# Patient Record
Sex: FEMALE | Race: OTHER | HISPANIC OR LATINO | URBAN - METROPOLITAN AREA
[De-identification: names, ages, dates, MRNs, and addresses within clinical notes are randomized per-mention and may not be internally consistent; named-entity substitution may affect disease eponyms.]

---

## 2018-04-17 ENCOUNTER — EMERGENCY (EMERGENCY)
Facility: HOSPITAL | Age: 28
LOS: 1 days | Discharge: HOME | End: 2018-04-17
Attending: EMERGENCY MEDICINE
Payer: MEDICAID

## 2018-04-17 VITALS
OXYGEN SATURATION: 98 % | HEART RATE: 85 BPM | DIASTOLIC BLOOD PRESSURE: 72 MMHG | WEIGHT: 166.01 LBS | HEIGHT: 70 IN | RESPIRATION RATE: 16 BRPM | TEMPERATURE: 98 F | SYSTOLIC BLOOD PRESSURE: 116 MMHG

## 2018-04-17 LAB — HCG UR QL: NEGATIVE — SIGNIFICANT CHANGE UP

## 2018-04-17 PROCEDURE — 81025 URINE PREGNANCY TEST: CPT

## 2018-04-17 PROCEDURE — 99285 EMERGENCY DEPT VISIT HI MDM: CPT

## 2018-04-17 PROCEDURE — 73080 X-RAY EXAM OF ELBOW: CPT | Mod: 26,LT

## 2018-04-17 PROCEDURE — 73080 X-RAY EXAM OF ELBOW: CPT

## 2018-04-17 PROCEDURE — 99283 EMERGENCY DEPT VISIT LOW MDM: CPT | Mod: 25

## 2018-04-17 RX ORDER — IBUPROFEN 200 MG
600 TABLET ORAL ONCE
Qty: 0 | Refills: 0 | Status: COMPLETED | OUTPATIENT
Start: 2018-04-17 | End: 2018-04-17

## 2018-04-17 RX ORDER — IBUPROFEN 200 MG
1 TABLET ORAL
Qty: 15 | Refills: 0
Start: 2018-04-17

## 2018-04-17 RX ADMIN — Medication 600 MILLIGRAM(S): at 10:10

## 2018-04-17 NOTE — ED PROVIDER NOTE - OBJECTIVE STATEMENT
29 y/o F pt with PMHx of Asthma and Heart Murmur and no significant PSHx presents to ED c/o L elbow pain x1 week. Pt believes L elbow pain may be due to lifting her infant from her stroller frequently. Pt additionally reports occasional numbness to the L hand and tingling to the L hand. Pt denies L shoulder pain, or any other complaints. Pt also denies recent fall, recent injury to the L elbow, or having taken any medications for pain relief. Allergies: Penicillin (hives).

## 2018-04-17 NOTE — ED ADULT NURSE NOTE - OBJECTIVE STATEMENT
pt is here for left elbow pain.  c/o pain 9/10, pt stated that "I didn't injury".  pt calm at this time, denied chest pain or sob.

## 2018-04-17 NOTE — ED PROVIDER NOTE - MEDICAL DECISION MAKING DETAILS
27 y/o F pt presents with atraumatic elbow pain, likely due to overuse. Will X-Ray L elbow, provide pain control, and will reassess. Pt is neurovascularly intact.

## 2018-07-19 ENCOUNTER — EMERGENCY (EMERGENCY)
Facility: HOSPITAL | Age: 28
LOS: 1 days | Discharge: HOME | End: 2018-07-19
Attending: EMERGENCY MEDICINE
Payer: MEDICAID

## 2018-07-19 VITALS
WEIGHT: 169.98 LBS | SYSTOLIC BLOOD PRESSURE: 133 MMHG | RESPIRATION RATE: 20 BRPM | HEIGHT: 70 IN | HEART RATE: 94 BPM | TEMPERATURE: 98 F | OXYGEN SATURATION: 100 % | DIASTOLIC BLOOD PRESSURE: 87 MMHG

## 2018-07-19 VITALS
DIASTOLIC BLOOD PRESSURE: 85 MMHG | TEMPERATURE: 99 F | SYSTOLIC BLOOD PRESSURE: 113 MMHG | HEART RATE: 71 BPM | OXYGEN SATURATION: 100 % | RESPIRATION RATE: 16 BRPM

## 2018-07-19 LAB
ALBUMIN SERPL ELPH-MCNC: 4 G/DL — SIGNIFICANT CHANGE UP (ref 3.5–5)
ALP SERPL-CCNC: 48 U/L — SIGNIFICANT CHANGE UP (ref 40–120)
ALT FLD-CCNC: 18 U/L DA — SIGNIFICANT CHANGE UP (ref 10–60)
ANION GAP SERPL CALC-SCNC: 5 MMOL/L — SIGNIFICANT CHANGE UP (ref 5–17)
APPEARANCE UR: CLEAR — SIGNIFICANT CHANGE UP
AST SERPL-CCNC: 10 U/L — SIGNIFICANT CHANGE UP (ref 10–40)
BACTERIA # UR AUTO: ABNORMAL /HPF
BILIRUB SERPL-MCNC: 0.5 MG/DL — SIGNIFICANT CHANGE UP (ref 0.2–1.2)
BILIRUB UR-MCNC: NEGATIVE — SIGNIFICANT CHANGE UP
BUN SERPL-MCNC: 13 MG/DL — SIGNIFICANT CHANGE UP (ref 7–18)
CALCIUM SERPL-MCNC: 9 MG/DL — SIGNIFICANT CHANGE UP (ref 8.4–10.5)
CHLORIDE SERPL-SCNC: 111 MMOL/L — HIGH (ref 96–108)
CO2 SERPL-SCNC: 26 MMOL/L — SIGNIFICANT CHANGE UP (ref 22–31)
COLOR SPEC: YELLOW — SIGNIFICANT CHANGE UP
CREAT SERPL-MCNC: 0.72 MG/DL — SIGNIFICANT CHANGE UP (ref 0.5–1.3)
DIFF PNL FLD: ABNORMAL
EPI CELLS # UR: SIGNIFICANT CHANGE UP /HPF
GLUCOSE SERPL-MCNC: 83 MG/DL — SIGNIFICANT CHANGE UP (ref 70–99)
GLUCOSE UR QL: NEGATIVE — SIGNIFICANT CHANGE UP
HCG SERPL-ACNC: <1 MIU/ML — SIGNIFICANT CHANGE UP
HCT VFR BLD CALC: 39.2 % — SIGNIFICANT CHANGE UP (ref 34.5–45)
HGB BLD-MCNC: 13.2 G/DL — SIGNIFICANT CHANGE UP (ref 11.5–15.5)
KETONES UR-MCNC: NEGATIVE — SIGNIFICANT CHANGE UP
LEUKOCYTE ESTERASE UR-ACNC: ABNORMAL
LIDOCAIN IGE QN: 62 U/L — LOW (ref 73–393)
MCHC RBC-ENTMCNC: 28.6 PG — SIGNIFICANT CHANGE UP (ref 27–34)
MCHC RBC-ENTMCNC: 33.6 GM/DL — SIGNIFICANT CHANGE UP (ref 32–36)
MCV RBC AUTO: 85.1 FL — SIGNIFICANT CHANGE UP (ref 80–100)
NITRITE UR-MCNC: NEGATIVE — SIGNIFICANT CHANGE UP
PH UR: 5 — SIGNIFICANT CHANGE UP (ref 5–8)
PLATELET # BLD AUTO: 339 K/UL — SIGNIFICANT CHANGE UP (ref 150–400)
POTASSIUM SERPL-MCNC: 4.3 MMOL/L — SIGNIFICANT CHANGE UP (ref 3.5–5.3)
POTASSIUM SERPL-SCNC: 4.3 MMOL/L — SIGNIFICANT CHANGE UP (ref 3.5–5.3)
PROT SERPL-MCNC: 7.7 G/DL — SIGNIFICANT CHANGE UP (ref 6–8.3)
PROT UR-MCNC: NEGATIVE — SIGNIFICANT CHANGE UP
RBC # BLD: 4.61 M/UL — SIGNIFICANT CHANGE UP (ref 3.8–5.2)
RBC # FLD: 12.3 % — SIGNIFICANT CHANGE UP (ref 10.3–14.5)
RBC CASTS # UR COMP ASSIST: SIGNIFICANT CHANGE UP /HPF (ref 0–2)
SODIUM SERPL-SCNC: 142 MMOL/L — SIGNIFICANT CHANGE UP (ref 135–145)
SP GR SPEC: 1.02 — SIGNIFICANT CHANGE UP (ref 1.01–1.02)
UROBILINOGEN FLD QL: NEGATIVE — SIGNIFICANT CHANGE UP
WBC # BLD: 9.5 K/UL — SIGNIFICANT CHANGE UP (ref 3.8–10.5)
WBC # FLD AUTO: 9.5 K/UL — SIGNIFICANT CHANGE UP (ref 3.8–10.5)
WBC UR QL: SIGNIFICANT CHANGE UP /HPF (ref 0–5)

## 2018-07-19 PROCEDURE — 80053 COMPREHEN METABOLIC PANEL: CPT

## 2018-07-19 PROCEDURE — 83690 ASSAY OF LIPASE: CPT

## 2018-07-19 PROCEDURE — 84702 CHORIONIC GONADOTROPIN TEST: CPT

## 2018-07-19 PROCEDURE — 96374 THER/PROPH/DIAG INJ IV PUSH: CPT

## 2018-07-19 PROCEDURE — 81001 URINALYSIS AUTO W/SCOPE: CPT

## 2018-07-19 PROCEDURE — 76705 ECHO EXAM OF ABDOMEN: CPT

## 2018-07-19 PROCEDURE — 85027 COMPLETE CBC AUTOMATED: CPT

## 2018-07-19 PROCEDURE — 99284 EMERGENCY DEPT VISIT MOD MDM: CPT

## 2018-07-19 PROCEDURE — 76705 ECHO EXAM OF ABDOMEN: CPT | Mod: 26

## 2018-07-19 PROCEDURE — 99284 EMERGENCY DEPT VISIT MOD MDM: CPT | Mod: 25

## 2018-07-19 RX ORDER — SODIUM CHLORIDE 9 MG/ML
10001 INJECTION INTRAMUSCULAR; INTRAVENOUS; SUBCUTANEOUS ONCE
Qty: 0 | Refills: 0 | Status: DISCONTINUED | OUTPATIENT
Start: 2018-07-19 | End: 2018-07-19

## 2018-07-19 RX ORDER — PANTOPRAZOLE SODIUM 20 MG/1
1 TABLET, DELAYED RELEASE ORAL
Qty: 30 | Refills: 0
Start: 2018-07-19 | End: 2018-08-17

## 2018-07-19 RX ORDER — FAMOTIDINE 10 MG/ML
20 INJECTION INTRAVENOUS ONCE
Qty: 0 | Refills: 0 | Status: COMPLETED | OUTPATIENT
Start: 2018-07-19 | End: 2018-07-19

## 2018-07-19 RX ORDER — SODIUM CHLORIDE 9 MG/ML
1000 INJECTION INTRAMUSCULAR; INTRAVENOUS; SUBCUTANEOUS ONCE
Qty: 0 | Refills: 0 | Status: COMPLETED | OUTPATIENT
Start: 2018-07-19 | End: 2018-07-19

## 2018-07-19 RX ADMIN — FAMOTIDINE 20 MILLIGRAM(S): 10 INJECTION INTRAVENOUS at 12:42

## 2018-07-19 RX ADMIN — SODIUM CHLORIDE 1000 MILLILITER(S): 9 INJECTION INTRAMUSCULAR; INTRAVENOUS; SUBCUTANEOUS at 12:30

## 2018-07-19 NOTE — ED PROVIDER NOTE - ABDOMINAL EXAM
soft/midline hernia above umbilicus, soft and reducible, midline surgical scar/nondistended/HERNIATION/tender...

## 2018-07-19 NOTE — ED PROVIDER NOTE - MEDICAL DECISION MAKING DETAILS
27 y/o F presents with upper abdominal pain. Will administer IV hydration and Pepcid. Will obtain ultrasound of gallbladder and check labs including CBC, CMP, lipase, urinalysis, and Beta-HCG. Will reassess.

## 2018-07-19 NOTE — ED PROVIDER NOTE - OBJECTIVE STATEMENT
29 y/o F with PMHx of asthma, heart murmur and no significant PSHx presents to the ED with complaints of upper abdominal pain x a few months. Patient states pain radiates to the back and leg. Patient states pain is worsening with associated nausea and vomiting. Patient states last bowel movement was yesterday. Patient denies hematuria, dysuria or any other complaints. LNMP: 6/2018. Allergies: Seafood (anaphylaxis), PCN (hives).

## 2018-09-01 ENCOUNTER — OUTPATIENT (OUTPATIENT)
Dept: OUTPATIENT SERVICES | Facility: HOSPITAL | Age: 28
LOS: 1 days | End: 2018-09-01
Payer: MEDICAID

## 2018-09-01 PROCEDURE — G9001: CPT

## 2018-09-20 ENCOUNTER — EMERGENCY (EMERGENCY)
Facility: HOSPITAL | Age: 28
LOS: 1 days | Discharge: HOME | End: 2018-09-20
Attending: STUDENT IN AN ORGANIZED HEALTH CARE EDUCATION/TRAINING PROGRAM
Payer: MEDICAID

## 2018-09-20 VITALS
RESPIRATION RATE: 16 BRPM | HEART RATE: 87 BPM | TEMPERATURE: 98 F | HEIGHT: 70 IN | DIASTOLIC BLOOD PRESSURE: 75 MMHG | SYSTOLIC BLOOD PRESSURE: 111 MMHG | OXYGEN SATURATION: 100 % | WEIGHT: 169.98 LBS

## 2018-09-20 VITALS
SYSTOLIC BLOOD PRESSURE: 129 MMHG | TEMPERATURE: 99 F | DIASTOLIC BLOOD PRESSURE: 67 MMHG | OXYGEN SATURATION: 100 % | HEART RATE: 93 BPM | RESPIRATION RATE: 18 BRPM

## 2018-09-20 LAB
ANION GAP SERPL CALC-SCNC: 5 MMOL/L — SIGNIFICANT CHANGE UP (ref 5–17)
APPEARANCE UR: CLEAR — SIGNIFICANT CHANGE UP
BILIRUB UR-MCNC: NEGATIVE — SIGNIFICANT CHANGE UP
BUN SERPL-MCNC: 6 MG/DL — LOW (ref 7–18)
CALCIUM SERPL-MCNC: 9 MG/DL — SIGNIFICANT CHANGE UP (ref 8.4–10.5)
CHLORIDE SERPL-SCNC: 108 MMOL/L — SIGNIFICANT CHANGE UP (ref 96–108)
CO2 SERPL-SCNC: 26 MMOL/L — SIGNIFICANT CHANGE UP (ref 22–31)
COLOR SPEC: YELLOW — SIGNIFICANT CHANGE UP
CREAT SERPL-MCNC: 0.54 MG/DL — SIGNIFICANT CHANGE UP (ref 0.5–1.3)
DIFF PNL FLD: ABNORMAL
GLUCOSE SERPL-MCNC: 87 MG/DL — SIGNIFICANT CHANGE UP (ref 70–99)
GLUCOSE UR QL: NEGATIVE — SIGNIFICANT CHANGE UP
HCG SERPL-ACNC: <1 MIU/ML — SIGNIFICANT CHANGE UP
HCT VFR BLD CALC: 37 % — SIGNIFICANT CHANGE UP (ref 34.5–45)
HGB BLD-MCNC: 12 G/DL — SIGNIFICANT CHANGE UP (ref 11.5–15.5)
KETONES UR-MCNC: ABNORMAL
LEUKOCYTE ESTERASE UR-ACNC: ABNORMAL
MCHC RBC-ENTMCNC: 27.8 PG — SIGNIFICANT CHANGE UP (ref 27–34)
MCHC RBC-ENTMCNC: 32.5 GM/DL — SIGNIFICANT CHANGE UP (ref 32–36)
MCV RBC AUTO: 85.4 FL — SIGNIFICANT CHANGE UP (ref 80–100)
NITRITE UR-MCNC: NEGATIVE — SIGNIFICANT CHANGE UP
PH UR: 6.5 — SIGNIFICANT CHANGE UP (ref 5–8)
PLATELET # BLD AUTO: 286 K/UL — SIGNIFICANT CHANGE UP (ref 150–400)
POTASSIUM SERPL-MCNC: 3.7 MMOL/L — SIGNIFICANT CHANGE UP (ref 3.5–5.3)
POTASSIUM SERPL-SCNC: 3.7 MMOL/L — SIGNIFICANT CHANGE UP (ref 3.5–5.3)
PROT UR-MCNC: 15
RBC # BLD: 4.33 M/UL — SIGNIFICANT CHANGE UP (ref 3.8–5.2)
RBC # FLD: 12.6 % — SIGNIFICANT CHANGE UP (ref 10.3–14.5)
SODIUM SERPL-SCNC: 139 MMOL/L — SIGNIFICANT CHANGE UP (ref 135–145)
SP GR SPEC: 1.01 — SIGNIFICANT CHANGE UP (ref 1.01–1.02)
UROBILINOGEN FLD QL: NEGATIVE — SIGNIFICANT CHANGE UP
WBC # BLD: 7.6 K/UL — SIGNIFICANT CHANGE UP (ref 3.8–10.5)
WBC # FLD AUTO: 7.6 K/UL — SIGNIFICANT CHANGE UP (ref 3.8–10.5)

## 2018-09-20 PROCEDURE — 85027 COMPLETE CBC AUTOMATED: CPT

## 2018-09-20 PROCEDURE — 71046 X-RAY EXAM CHEST 2 VIEWS: CPT

## 2018-09-20 PROCEDURE — 93005 ELECTROCARDIOGRAM TRACING: CPT

## 2018-09-20 PROCEDURE — 71046 X-RAY EXAM CHEST 2 VIEWS: CPT | Mod: 26

## 2018-09-20 PROCEDURE — 84702 CHORIONIC GONADOTROPIN TEST: CPT

## 2018-09-20 PROCEDURE — 81001 URINALYSIS AUTO W/SCOPE: CPT

## 2018-09-20 PROCEDURE — 80048 BASIC METABOLIC PNL TOTAL CA: CPT

## 2018-09-20 PROCEDURE — 36415 COLL VENOUS BLD VENIPUNCTURE: CPT

## 2018-09-20 PROCEDURE — 99284 EMERGENCY DEPT VISIT MOD MDM: CPT | Mod: 25

## 2018-09-20 PROCEDURE — 99284 EMERGENCY DEPT VISIT MOD MDM: CPT

## 2018-09-20 PROCEDURE — 96374 THER/PROPH/DIAG INJ IV PUSH: CPT

## 2018-09-20 RX ORDER — SODIUM CHLORIDE 9 MG/ML
1000 INJECTION INTRAMUSCULAR; INTRAVENOUS; SUBCUTANEOUS ONCE
Qty: 0 | Refills: 0 | Status: COMPLETED | OUTPATIENT
Start: 2018-09-20 | End: 2018-09-20

## 2018-09-20 RX ORDER — LORATADINE 10 MG/1
1 TABLET ORAL
Qty: 14 | Refills: 0
Start: 2018-09-20 | End: 2018-10-03

## 2018-09-20 RX ORDER — KETOROLAC TROMETHAMINE 30 MG/ML
30 SYRINGE (ML) INJECTION ONCE
Qty: 0 | Refills: 0 | Status: DISCONTINUED | OUTPATIENT
Start: 2018-09-20 | End: 2018-09-20

## 2018-09-20 RX ADMIN — SODIUM CHLORIDE 1000 MILLILITER(S): 9 INJECTION INTRAMUSCULAR; INTRAVENOUS; SUBCUTANEOUS at 10:02

## 2018-09-20 RX ADMIN — Medication 30 MILLIGRAM(S): at 10:20

## 2018-09-20 RX ADMIN — Medication 30 MILLIGRAM(S): at 10:02

## 2018-09-20 NOTE — ED PROVIDER NOTE - OBJECTIVE STATEMENT
cough, chest , subjective fever, body ache. no ocp, no cardiac risk factor 28 y.o presenting with 2 days of congestion, cough, generalize malaise. Endorses cp but describes and pain over her entire torso. denies fever, n, v, recent travel, sick contact, calf pain, swelling, ocp, family of young cardiac issues or deaths. denies sob. denies ocp use

## 2018-09-20 NOTE — ED ADULT NURSE NOTE - NSIMPLEMENTINTERV_GEN_ALL_ED
Implemented All Universal Safety Interventions:  [place] to call system. Call bell, personal items and telephone within reach. Instruct patient to call for assistance. Room bathroom lighting operational. Non-slip footwear when patient is off stretcher. Physically safe environment: no spills, clutter or unnecessary equipment. Stretcher in lowest position, wheels locked, appropriate side rails in place.

## 2018-09-20 NOTE — ED PROVIDER NOTE - MEDICAL DECISION MAKING DETAILS
patient presenting with complaint of cough and chest pain. on further eval chest appears 2/2 to generalize bodyache. patient otherwise well appearing. low acs score, no family history, cp does not appear acs related. perc -. likely viral. will obtain cx rto r.o pna, ptx. basic. fluid hydration, pain med and reassess

## 2018-09-20 NOTE — ED ADULT TRIAGE NOTE - STATUS:
Applied Referred To Otolaryngology For Closure Text (Leave Blank If You Do Not Want): After obtaining clear surgical margins the patient was sent to otolaryngology for surgical repair.  The patient understands they will receive post-surgical care and follow-up from the referring physician's office.

## 2018-09-28 DIAGNOSIS — Z71.89 OTHER SPECIFIED COUNSELING: ICD-10-CM

## 2019-03-31 ENCOUNTER — TRANSCRIPTION ENCOUNTER (OUTPATIENT)
Age: 29
End: 2019-03-31

## 2019-05-24 ENCOUNTER — EMERGENCY (EMERGENCY)
Facility: HOSPITAL | Age: 29
LOS: 1 days | Discharge: HOME | End: 2019-05-24
Attending: EMERGENCY MEDICINE
Payer: MEDICAID

## 2019-05-24 VITALS
HEART RATE: 86 BPM | SYSTOLIC BLOOD PRESSURE: 106 MMHG | TEMPERATURE: 98 F | HEIGHT: 70 IN | RESPIRATION RATE: 16 BRPM | DIASTOLIC BLOOD PRESSURE: 66 MMHG | WEIGHT: 169.98 LBS | OXYGEN SATURATION: 99 %

## 2019-05-24 PROCEDURE — 99283 EMERGENCY DEPT VISIT LOW MDM: CPT

## 2019-05-24 NOTE — ED ADULT NURSE NOTE - NSIMPLEMENTINTERV_GEN_ALL_ED
Implemented All Universal Safety Interventions:  [place] to call system. Call bell, personal items and telephone within reach. Instruct patient to call for assistance. Room bathroom lighting operational. Non-slip footwear when patient is off stretcher. Physically safe environment: no spills, clutter or unnecessary equipment. Stretcher in lowest position, wheels locked, appropriate side rails in place.

## 2019-05-24 NOTE — ED ADULT NURSE NOTE - OBJECTIVE STATEMENT
Pt is caring for 1y/o twins with disabilities, states she needs some support, feels very tired, has anxiety bouts  Denies SI/HI, states would like a referral for psychologist  Brought one of the twins for c/o cough and fever

## 2019-05-24 NOTE — CHART NOTE - NSCHARTNOTEFT_GEN_A_CORE
Patient is a 29 year old female with a PMHx of asthma, and heart murmur.  Patient presented to [facility] ED with c/o fatigue x 1 week. Patient noted she was in the ED accompanying her sick son but notes feeling tired and overwhelmed; patient was triaged to be a patient. Patient referred to SW for outpatient mental health resources and emotional support.  ED SWer met with patient in ED at bedside; 2.8 year old son sleeping in stroller. Role of SW was explained; contact information provided.  Patient was observed tearful and over-whelmed; denied any past or current SI or past SA.  Patient reports that son may have an ASD dx's and that his twin sister in currently being cared for by patient's mother.  Patient reports both children obtaining EI services and son is being assessed for services through [facility].  Emotional support provided as patient reports she has been tired, anxious and concerned reg: son's overall development.  Techniques to decrease the described feelings were discussed; resources for low-cost/free  provided; information to support groups for parents provided and accepted by patient.  Patient receptive to being set up with an outpatient mental health appt.  List of choices provided and voice-message for the  [facility] was left requesting a call back to set up an initial screening with patient for an appt. at [facility] in [place].  Patient is socially cleared for d/c at this time; ED MD and medical team made aware of plan.

## 2019-05-24 NOTE — ED PROVIDER NOTE - OBJECTIVE STATEMENT
30 y/o female with PMHx of asthma, heart murmur presents to the ED c/o fatigue x 1 week. Pt notes she was in the ED accompanying her sick son but notes feeling tired and overwhelmed, so pt was triaged to be a pt. Pt notes while taking care of her sick son, he has a twin sister she has to take care of as well. Social support: pt's mother is taking care of her the daughter, who is not present in the ED. Pt denies SI, HI, thoughts of hurting her children, or any other complaints. Pt is receptive for outpatient f/u with therapy or someone to talk to. Pt allergic to Penicillin (hives).

## 2019-06-18 ENCOUNTER — EMERGENCY (EMERGENCY)
Facility: HOSPITAL | Age: 29
LOS: 1 days | Discharge: HOME | End: 2019-06-18
Attending: EMERGENCY MEDICINE
Payer: MEDICAID

## 2019-06-18 VITALS
HEIGHT: 70 IN | SYSTOLIC BLOOD PRESSURE: 115 MMHG | OXYGEN SATURATION: 100 % | WEIGHT: 169.98 LBS | HEART RATE: 80 BPM | TEMPERATURE: 98 F | RESPIRATION RATE: 16 BRPM | DIASTOLIC BLOOD PRESSURE: 70 MMHG

## 2019-06-18 LAB
ALBUMIN SERPL ELPH-MCNC: 4 G/DL — SIGNIFICANT CHANGE UP (ref 3.5–5)
ALP SERPL-CCNC: 47 U/L — SIGNIFICANT CHANGE UP (ref 40–120)
ALT FLD-CCNC: 12 U/L DA — SIGNIFICANT CHANGE UP (ref 10–60)
ANION GAP SERPL CALC-SCNC: 4 MMOL/L — LOW (ref 5–17)
APPEARANCE UR: CLEAR — SIGNIFICANT CHANGE UP
AST SERPL-CCNC: 8 U/L — LOW (ref 10–40)
BASOPHILS # BLD AUTO: 0.06 K/UL — SIGNIFICANT CHANGE UP (ref 0–0.2)
BASOPHILS NFR BLD AUTO: 0.7 % — SIGNIFICANT CHANGE UP (ref 0–2)
BILIRUB SERPL-MCNC: 0.6 MG/DL — SIGNIFICANT CHANGE UP (ref 0.2–1.2)
BILIRUB UR-MCNC: NEGATIVE — SIGNIFICANT CHANGE UP
BUN SERPL-MCNC: 10 MG/DL — SIGNIFICANT CHANGE UP (ref 7–18)
CALCIUM SERPL-MCNC: 9.2 MG/DL — SIGNIFICANT CHANGE UP (ref 8.4–10.5)
CHLORIDE SERPL-SCNC: 109 MMOL/L — HIGH (ref 96–108)
CO2 SERPL-SCNC: 28 MMOL/L — SIGNIFICANT CHANGE UP (ref 22–31)
COLOR SPEC: YELLOW — SIGNIFICANT CHANGE UP
CREAT SERPL-MCNC: 0.6 MG/DL — SIGNIFICANT CHANGE UP (ref 0.5–1.3)
DIFF PNL FLD: ABNORMAL
EOSINOPHIL # BLD AUTO: 0.35 K/UL — SIGNIFICANT CHANGE UP (ref 0–0.5)
EOSINOPHIL NFR BLD AUTO: 4.1 % — SIGNIFICANT CHANGE UP (ref 0–6)
EPI CELLS # UR: SIGNIFICANT CHANGE UP /HPF
GLUCOSE SERPL-MCNC: 83 MG/DL — SIGNIFICANT CHANGE UP (ref 70–99)
GLUCOSE UR QL: NEGATIVE — SIGNIFICANT CHANGE UP
HCG UR QL: NEGATIVE — SIGNIFICANT CHANGE UP
HCT VFR BLD CALC: 40.3 % — SIGNIFICANT CHANGE UP (ref 34.5–45)
HGB BLD-MCNC: 12.9 G/DL — SIGNIFICANT CHANGE UP (ref 11.5–15.5)
IMM GRANULOCYTES NFR BLD AUTO: 0.2 % — SIGNIFICANT CHANGE UP (ref 0–1.5)
KETONES UR-MCNC: NEGATIVE — SIGNIFICANT CHANGE UP
LEUKOCYTE ESTERASE UR-ACNC: ABNORMAL
LYMPHOCYTES # BLD AUTO: 2.03 K/UL — SIGNIFICANT CHANGE UP (ref 1–3.3)
LYMPHOCYTES # BLD AUTO: 23.5 % — SIGNIFICANT CHANGE UP (ref 13–44)
MCHC RBC-ENTMCNC: 28.1 PG — SIGNIFICANT CHANGE UP (ref 27–34)
MCHC RBC-ENTMCNC: 32 GM/DL — SIGNIFICANT CHANGE UP (ref 32–36)
MCV RBC AUTO: 87.8 FL — SIGNIFICANT CHANGE UP (ref 80–100)
MONOCYTES # BLD AUTO: 0.6 K/UL — SIGNIFICANT CHANGE UP (ref 0–0.9)
MONOCYTES NFR BLD AUTO: 7 % — SIGNIFICANT CHANGE UP (ref 2–14)
NEUTROPHILS # BLD AUTO: 5.56 K/UL — SIGNIFICANT CHANGE UP (ref 1.8–7.4)
NEUTROPHILS NFR BLD AUTO: 64.5 % — SIGNIFICANT CHANGE UP (ref 43–77)
NITRITE UR-MCNC: NEGATIVE — SIGNIFICANT CHANGE UP
NRBC # BLD: 0 /100 WBCS — SIGNIFICANT CHANGE UP (ref 0–0)
PH UR: 7 — SIGNIFICANT CHANGE UP (ref 5–8)
PLATELET # BLD AUTO: 341 K/UL — SIGNIFICANT CHANGE UP (ref 150–400)
POTASSIUM SERPL-MCNC: 4.4 MMOL/L — SIGNIFICANT CHANGE UP (ref 3.5–5.3)
POTASSIUM SERPL-SCNC: 4.4 MMOL/L — SIGNIFICANT CHANGE UP (ref 3.5–5.3)
PROT SERPL-MCNC: 7.5 G/DL — SIGNIFICANT CHANGE UP (ref 6–8.3)
PROT UR-MCNC: 15
RBC # BLD: 4.59 M/UL — SIGNIFICANT CHANGE UP (ref 3.8–5.2)
RBC # FLD: 12.8 % — SIGNIFICANT CHANGE UP (ref 10.3–14.5)
RBC CASTS # UR COMP ASSIST: SIGNIFICANT CHANGE UP /HPF (ref 0–2)
SODIUM SERPL-SCNC: 141 MMOL/L — SIGNIFICANT CHANGE UP (ref 135–145)
SP GR SPEC: 1.01 — SIGNIFICANT CHANGE UP (ref 1.01–1.02)
UROBILINOGEN FLD QL: NEGATIVE — SIGNIFICANT CHANGE UP
WBC # BLD: 8.62 K/UL — SIGNIFICANT CHANGE UP (ref 3.8–10.5)
WBC # FLD AUTO: 8.62 K/UL — SIGNIFICANT CHANGE UP (ref 3.8–10.5)
WBC UR QL: SIGNIFICANT CHANGE UP /HPF (ref 0–5)

## 2019-06-18 PROCEDURE — 96374 THER/PROPH/DIAG INJ IV PUSH: CPT

## 2019-06-18 PROCEDURE — 99284 EMERGENCY DEPT VISIT MOD MDM: CPT | Mod: 25

## 2019-06-18 PROCEDURE — 81001 URINALYSIS AUTO W/SCOPE: CPT

## 2019-06-18 PROCEDURE — 74176 CT ABD & PELVIS W/O CONTRAST: CPT | Mod: 26

## 2019-06-18 PROCEDURE — 85027 COMPLETE CBC AUTOMATED: CPT

## 2019-06-18 PROCEDURE — 74176 CT ABD & PELVIS W/O CONTRAST: CPT

## 2019-06-18 PROCEDURE — 36415 COLL VENOUS BLD VENIPUNCTURE: CPT

## 2019-06-18 PROCEDURE — 99284 EMERGENCY DEPT VISIT MOD MDM: CPT

## 2019-06-18 PROCEDURE — 80053 COMPREHEN METABOLIC PANEL: CPT

## 2019-06-18 PROCEDURE — 87086 URINE CULTURE/COLONY COUNT: CPT

## 2019-06-18 PROCEDURE — 81025 URINE PREGNANCY TEST: CPT

## 2019-06-18 RX ORDER — ACETAMINOPHEN 500 MG
1000 TABLET ORAL ONCE
Refills: 0 | Status: COMPLETED | OUTPATIENT
Start: 2019-06-18 | End: 2019-06-18

## 2019-06-18 RX ORDER — SODIUM CHLORIDE 9 MG/ML
1000 INJECTION INTRAMUSCULAR; INTRAVENOUS; SUBCUTANEOUS ONCE
Refills: 0 | Status: COMPLETED | OUTPATIENT
Start: 2019-06-18 | End: 2019-06-18

## 2019-06-18 RX ORDER — ALBUTEROL 90 UG/1
2 AEROSOL, METERED ORAL
Qty: 1 | Refills: 0
Start: 2019-06-18

## 2019-06-18 RX ADMIN — Medication 400 MILLIGRAM(S): at 14:52

## 2019-06-18 RX ADMIN — SODIUM CHLORIDE 1000 MILLILITER(S): 9 INJECTION INTRAMUSCULAR; INTRAVENOUS; SUBCUTANEOUS at 14:52

## 2019-06-18 NOTE — ED PROVIDER NOTE - OBJECTIVE STATEMENT
30 y/o F patient with a significant PMHx of Asthma, Heart Murmur and no significant PSHx presents to the ED with right flank pain and suprapubic pain for x1 week. Patient endorses nausea and diaphoresis. Patient denies dysuria, dyspareunia, and any other complaints. Patient is currently on her menses. Allergies: Penicillin (Hives).

## 2019-06-18 NOTE — ED PROVIDER NOTE - PSYCHIATRIC, MLM
Well baby, under 8 days old Alert and oriented to person, place, time/situation. normal mood and affect. no apparent risk to self or others.

## 2019-06-18 NOTE — ED ADULT NURSE NOTE - NSIMPLEMENTINTERV_GEN_ALL_ED
Implemented All Universal Safety Interventions:  [place] to call system. Call bell, personal items and telephone within reach. Instruct patient to call for assistance. Room bathroom lighting operational. Non-slip footwear when patient is off stretcher. Physically safe environment: no spills, clutter or unnecessary equipment. Stretcher in lowest position, wheels locked, appropriate side rails in place.

## 2019-06-19 LAB
CULTURE RESULTS: NO GROWTH — SIGNIFICANT CHANGE UP
SPECIMEN SOURCE: SIGNIFICANT CHANGE UP

## 2020-01-19 DIAGNOSIS — Z86.79 PERSONAL HISTORY OF OTHER DISEASES OF THE CIRCULATORY SYSTEM: ICD-10-CM

## 2020-01-19 DIAGNOSIS — Z78.9 OTHER SPECIFIED HEALTH STATUS: ICD-10-CM

## 2020-01-19 DIAGNOSIS — Z87.74 PERSONAL HISTORY OF (CORRECTED) CONGENITAL MALFORMATIONS OF HEART AND CIRCULATORY SYSTEM: ICD-10-CM

## 2020-01-19 DIAGNOSIS — Z87.09 PERSONAL HISTORY OF OTHER DISEASES OF THE RESPIRATORY SYSTEM: ICD-10-CM

## 2020-01-19 DIAGNOSIS — Z86.59 PERSONAL HISTORY OF OTHER MENTAL AND BEHAVIORAL DISORDERS: ICD-10-CM

## 2020-01-19 RX ORDER — ALBUTEROL SULFATE 5 MG/ML
SOLUTION, NON-ORAL INHALATION
Refills: 0 | Status: ACTIVE | COMMUNITY

## 2020-02-04 ENCOUNTER — APPOINTMENT (OUTPATIENT)
Dept: OBGYN | Facility: CLINIC | Age: 30
End: 2020-02-04
Payer: MEDICAID

## 2020-02-04 VITALS
HEART RATE: 87 BPM | WEIGHT: 167 LBS | BODY MASS INDEX: 24.73 KG/M2 | OXYGEN SATURATION: 98 % | TEMPERATURE: 98.9 F | SYSTOLIC BLOOD PRESSURE: 117 MMHG | HEIGHT: 69 IN | DIASTOLIC BLOOD PRESSURE: 74 MMHG

## 2020-02-04 DIAGNOSIS — Z86.79 PERSONAL HISTORY OF OTHER DISEASES OF THE CIRCULATORY SYSTEM: ICD-10-CM

## 2020-02-04 DIAGNOSIS — Z83.3 FAMILY HISTORY OF DIABETES MELLITUS: ICD-10-CM

## 2020-02-04 PROCEDURE — 99203 OFFICE O/P NEW LOW 30 MIN: CPT

## 2020-02-05 LAB
C TRACH RRNA SPEC QL NAA+PROBE: NOT DETECTED
HPV HIGH+LOW RISK DNA PNL CVX: NOT DETECTED
N GONORRHOEA RRNA SPEC QL NAA+PROBE: NOT DETECTED
SOURCE TP AMPLIFICATION: NORMAL

## 2020-02-05 NOTE — ED PROVIDER NOTE - HISTORY ATTESTATION, MLM
----- Message from Susi Hill MD sent at 2/5/2020  8:02 AM CST -----  1. Your hemoglobin (iron count) and WBC (infection count), platelet count (that helps stop bleeding when you get hurt) were noted to be within normal limits.   2.Your liver functions, kidney functions are excellent  3. Your sugar is 120 which is borderline high but you prev possibly were not fasting when the blood test was taken.  4. Your magnesium was noted to be within normal limits.  5.  Your thyroid was noted to be within normal limit. No further testing or treatment is needed.    Your blood work does not show that your dehydrated either.  I would recommend if dizziness continues proceed with further plans of action as discussed in the office    
Patient verbally stated that she understood lab results and no further questions at this time.     
I have reviewed and confirmed nurses' notes...

## 2020-02-10 LAB — CYTOLOGY CVX/VAG DOC THIN PREP: ABNORMAL

## 2020-02-13 ENCOUNTER — APPOINTMENT (OUTPATIENT)
Dept: OBGYN | Facility: CLINIC | Age: 30
End: 2020-02-13
Payer: MEDICAID

## 2020-02-13 VITALS
TEMPERATURE: 98.6 F | WEIGHT: 170 LBS | HEIGHT: 69 IN | SYSTOLIC BLOOD PRESSURE: 125 MMHG | DIASTOLIC BLOOD PRESSURE: 75 MMHG | HEART RATE: 87 BPM | OXYGEN SATURATION: 99 % | BODY MASS INDEX: 25.18 KG/M2

## 2020-02-13 PROCEDURE — 99213 OFFICE O/P EST LOW 20 MIN: CPT

## 2020-02-13 NOTE — HISTORY OF PRESENT ILLNESS
[Normal Amount/Duration] : was of a normal amount and duration [Regular Cycle Intervals] : periods have been regular [Definite:  ___ (Date)] : the last menstrual period was [unfilled] [Menarche Age: ____] : age at menarche was [unfilled] [Sexually Active] : is sexually active [Male ___] : [unfilled] male

## 2020-02-13 NOTE — PHYSICAL EXAM
[Awake] : awake [Alert] : alert [Mass] : no breast mass [Acute Distress] : no acute distress [Nipple Discharge] : no nipple discharge [Axillary LAD] : no axillary lymphadenopathy [Soft] : soft [Tender] : non tender [Oriented x3] : oriented to person, place, and time [Normal] : uterus [Pap Obtained] : a Pap smear was performed [No Bleeding] : there was no active vaginal bleeding [Uterine Adnexae] : were not tender and not enlarged

## 2020-02-20 LAB — CYTOLOGY CVX/VAG DOC THIN PREP: NORMAL

## 2020-04-02 ENCOUNTER — INPATIENT (INPATIENT)
Facility: HOSPITAL | Age: 30
LOS: 1 days | Discharge: OTHER INSTITUTION | DRG: 177 | End: 2020-04-04
Attending: HOSPITALIST | Admitting: HOSPITALIST
Payer: MEDICAID

## 2020-04-02 VITALS
SYSTOLIC BLOOD PRESSURE: 90 MMHG | RESPIRATION RATE: 18 BRPM | HEART RATE: 130 BPM | OXYGEN SATURATION: 95 % | DIASTOLIC BLOOD PRESSURE: 56 MMHG | HEIGHT: 70 IN | TEMPERATURE: 103 F | WEIGHT: 149.91 LBS

## 2020-04-02 DIAGNOSIS — B34.2 CORONAVIRUS INFECTION, UNSPECIFIED: ICD-10-CM

## 2020-04-02 LAB
ALBUMIN SERPL ELPH-MCNC: 2.9 G/DL — LOW (ref 3.5–5)
ALP SERPL-CCNC: 57 U/L — SIGNIFICANT CHANGE UP (ref 40–120)
ALT FLD-CCNC: 16 U/L DA — SIGNIFICANT CHANGE UP (ref 10–60)
ANION GAP SERPL CALC-SCNC: 8 MMOL/L — SIGNIFICANT CHANGE UP (ref 5–17)
APPEARANCE UR: ABNORMAL
AST SERPL-CCNC: 25 U/L — SIGNIFICANT CHANGE UP (ref 10–40)
BACTERIA # UR AUTO: ABNORMAL /HPF
BASOPHILS # BLD AUTO: 0.02 K/UL — SIGNIFICANT CHANGE UP (ref 0–0.2)
BASOPHILS NFR BLD AUTO: 0.2 % — SIGNIFICANT CHANGE UP (ref 0–2)
BILIRUB SERPL-MCNC: 0.5 MG/DL — SIGNIFICANT CHANGE UP (ref 0.2–1.2)
BILIRUB UR-MCNC: ABNORMAL
BUN SERPL-MCNC: 12 MG/DL — SIGNIFICANT CHANGE UP (ref 7–18)
CALCIUM SERPL-MCNC: 8.7 MG/DL — SIGNIFICANT CHANGE UP (ref 8.4–10.5)
CHLORIDE SERPL-SCNC: 108 MMOL/L — SIGNIFICANT CHANGE UP (ref 96–108)
CO2 SERPL-SCNC: 22 MMOL/L — SIGNIFICANT CHANGE UP (ref 22–31)
COLOR SPEC: YELLOW — SIGNIFICANT CHANGE UP
COMMENT - URINE: SIGNIFICANT CHANGE UP
CREAT SERPL-MCNC: 0.74 MG/DL — SIGNIFICANT CHANGE UP (ref 0.5–1.3)
DIFF PNL FLD: ABNORMAL
EOSINOPHIL # BLD AUTO: 0.05 K/UL — SIGNIFICANT CHANGE UP (ref 0–0.5)
EOSINOPHIL NFR BLD AUTO: 0.4 % — SIGNIFICANT CHANGE UP (ref 0–6)
EPI CELLS # UR: ABNORMAL /HPF
GLUCOSE SERPL-MCNC: 94 MG/DL — SIGNIFICANT CHANGE UP (ref 70–99)
GLUCOSE UR QL: NEGATIVE — SIGNIFICANT CHANGE UP
HCG SERPL-ACNC: <1 MIU/ML — SIGNIFICANT CHANGE UP
HCT VFR BLD CALC: 37.7 % — SIGNIFICANT CHANGE UP (ref 34.5–45)
HGB BLD-MCNC: 12.5 G/DL — SIGNIFICANT CHANGE UP (ref 11.5–15.5)
IMM GRANULOCYTES NFR BLD AUTO: 0.4 % — SIGNIFICANT CHANGE UP (ref 0–1.5)
KETONES UR-MCNC: ABNORMAL
LACTATE SERPL-SCNC: 1.4 MMOL/L — SIGNIFICANT CHANGE UP (ref 0.7–2)
LEUKOCYTE ESTERASE UR-ACNC: NEGATIVE — SIGNIFICANT CHANGE UP
LYMPHOCYTES # BLD AUTO: 0.67 K/UL — LOW (ref 1–3.3)
LYMPHOCYTES # BLD AUTO: 5.9 % — LOW (ref 13–44)
MAGNESIUM SERPL-MCNC: 2 MG/DL — SIGNIFICANT CHANGE UP (ref 1.6–2.6)
MCHC RBC-ENTMCNC: 28.5 PG — SIGNIFICANT CHANGE UP (ref 27–34)
MCHC RBC-ENTMCNC: 33.2 GM/DL — SIGNIFICANT CHANGE UP (ref 32–36)
MCV RBC AUTO: 85.9 FL — SIGNIFICANT CHANGE UP (ref 80–100)
MONOCYTES # BLD AUTO: 0.29 K/UL — SIGNIFICANT CHANGE UP (ref 0–0.9)
MONOCYTES NFR BLD AUTO: 2.5 % — SIGNIFICANT CHANGE UP (ref 2–14)
NEUTROPHILS # BLD AUTO: 10.31 K/UL — HIGH (ref 1.8–7.4)
NEUTROPHILS NFR BLD AUTO: 90.6 % — HIGH (ref 43–77)
NITRITE UR-MCNC: NEGATIVE — SIGNIFICANT CHANGE UP
NRBC # BLD: 0 /100 WBCS — SIGNIFICANT CHANGE UP (ref 0–0)
PH UR: 6 — SIGNIFICANT CHANGE UP (ref 5–8)
PLATELET # BLD AUTO: 332 K/UL — SIGNIFICANT CHANGE UP (ref 150–400)
POTASSIUM SERPL-MCNC: 3.9 MMOL/L — SIGNIFICANT CHANGE UP (ref 3.5–5.3)
POTASSIUM SERPL-SCNC: 3.9 MMOL/L — SIGNIFICANT CHANGE UP (ref 3.5–5.3)
PROT SERPL-MCNC: 8.1 G/DL — SIGNIFICANT CHANGE UP (ref 6–8.3)
PROT UR-MCNC: 100
RBC # BLD: 4.39 M/UL — SIGNIFICANT CHANGE UP (ref 3.8–5.2)
RBC # FLD: 13.3 % — SIGNIFICANT CHANGE UP (ref 10.3–14.5)
RBC CASTS # UR COMP ASSIST: ABNORMAL /HPF (ref 0–2)
SODIUM SERPL-SCNC: 138 MMOL/L — SIGNIFICANT CHANGE UP (ref 135–145)
SP GR SPEC: 1.01 — SIGNIFICANT CHANGE UP (ref 1.01–1.02)
UROBILINOGEN FLD QL: 4
WBC # BLD: 11.39 K/UL — HIGH (ref 3.8–10.5)
WBC # FLD AUTO: 11.39 K/UL — HIGH (ref 3.8–10.5)
WBC UR QL: SIGNIFICANT CHANGE UP /HPF (ref 0–5)

## 2020-04-02 PROCEDURE — 99285 EMERGENCY DEPT VISIT HI MDM: CPT

## 2020-04-02 PROCEDURE — 71045 X-RAY EXAM CHEST 1 VIEW: CPT | Mod: 26

## 2020-04-02 RX ORDER — SODIUM CHLORIDE 9 MG/ML
500 INJECTION INTRAMUSCULAR; INTRAVENOUS; SUBCUTANEOUS ONCE
Refills: 0 | Status: COMPLETED | OUTPATIENT
Start: 2020-04-02 | End: 2020-04-02

## 2020-04-02 RX ORDER — AZITHROMYCIN 500 MG/1
500 TABLET, FILM COATED ORAL ONCE
Refills: 0 | Status: COMPLETED | OUTPATIENT
Start: 2020-04-02 | End: 2020-04-02

## 2020-04-02 RX ORDER — SODIUM CHLORIDE 9 MG/ML
1000 INJECTION, SOLUTION INTRAVENOUS
Refills: 0 | Status: DISCONTINUED | OUTPATIENT
Start: 2020-04-02 | End: 2020-04-03

## 2020-04-02 RX ORDER — ALBUTEROL 90 UG/1
1 AEROSOL, METERED ORAL EVERY 4 HOURS
Refills: 0 | Status: COMPLETED | OUTPATIENT
Start: 2020-04-02 | End: 2021-03-01

## 2020-04-02 RX ORDER — ALBUTEROL 90 UG/1
1 AEROSOL, METERED ORAL EVERY 4 HOURS
Refills: 0 | Status: DISCONTINUED | OUTPATIENT
Start: 2020-04-02 | End: 2020-04-04

## 2020-04-02 RX ORDER — ACETAMINOPHEN 500 MG
975 TABLET ORAL ONCE
Refills: 0 | Status: COMPLETED | OUTPATIENT
Start: 2020-04-02 | End: 2020-04-02

## 2020-04-02 RX ADMIN — Medication 975 MILLIGRAM(S): at 20:01

## 2020-04-02 RX ADMIN — AZITHROMYCIN 500 MILLIGRAM(S): 500 TABLET, FILM COATED ORAL at 20:00

## 2020-04-02 RX ADMIN — SODIUM CHLORIDE 500 MILLILITER(S): 9 INJECTION INTRAMUSCULAR; INTRAVENOUS; SUBCUTANEOUS at 20:01

## 2020-04-02 NOTE — ED PROVIDER NOTE - OBJECTIVE STATEMENT
COVID positive patients presents to the ED for evaluation.  Complains of weakness, fatigue, cough, pleuritic chest pain.  Also endorses diarrhea and mild abdominal discomfort.  Denies sick contacts.  Hx of asthma (no hx of intubation).  Diagnosed with COVID yesterday but has had symptoms for > 1 week.

## 2020-04-02 NOTE — ED PROVIDER NOTE - PROGRESS NOTE DETAILS
Vitals improved but still subjectively feels weak,.  Unable to sit up to attempt walking trial.  Ketonuria, leukocytosis + lymphopenia.   Given D5NS at 100cc/hr for substrate.  NS bolus 500 cc.  Will attempt to feed the patient.  Likely admission candidate pending imaging results and reassessment.  Will also order azithromycin and albuterol. failed walking trial.  became hypoxic - 92% on ra, tachycardiac and increasingly weak.  Will admit.  D/w Dr. Freeman

## 2020-04-02 NOTE — ED PROVIDER NOTE - DIAGNOSTIC INTERPRETATION
ER Physician: Jorge Luis Madrigal  CHEST XRAY INTERPRETATION: B/L infiltrates. heart shadow normal, bony structures intact

## 2020-04-02 NOTE — ED PROVIDER NOTE - CLINICAL SUMMARY MEDICAL DECISION MAKING FREE TEXT BOX
Initially hypotensive, tachycardia, and hypoxic in triage.  Upon my examination patient appears moderately ill.  Mild increased WOB with clear lungs.  Placed on 2L NC.  BP and HR improved.  No abdominal tenderness.  Recently tested + for COVID and with history of asthma.  Labs, CXR, Tylenol, 500 cc IV bolus (endorsed diarrhea and vomiting).

## 2020-04-03 VITALS
HEART RATE: 102 BPM | OXYGEN SATURATION: 98 % | TEMPERATURE: 99 F | DIASTOLIC BLOOD PRESSURE: 65 MMHG | RESPIRATION RATE: 18 BRPM | SYSTOLIC BLOOD PRESSURE: 97 MMHG

## 2020-04-03 DIAGNOSIS — J96.01 ACUTE RESPIRATORY FAILURE WITH HYPOXIA: ICD-10-CM

## 2020-04-03 DIAGNOSIS — B34.2 CORONAVIRUS INFECTION, UNSPECIFIED: ICD-10-CM

## 2020-04-03 DIAGNOSIS — Z29.9 ENCOUNTER FOR PROPHYLACTIC MEASURES, UNSPECIFIED: ICD-10-CM

## 2020-04-03 LAB
ALBUMIN SERPL ELPH-MCNC: 2.6 G/DL — LOW (ref 3.5–5)
ALP SERPL-CCNC: 56 U/L — SIGNIFICANT CHANGE UP (ref 40–120)
ALT FLD-CCNC: 14 U/L DA — SIGNIFICANT CHANGE UP (ref 10–60)
ANION GAP SERPL CALC-SCNC: 9 MMOL/L — SIGNIFICANT CHANGE UP (ref 5–17)
AST SERPL-CCNC: 25 U/L — SIGNIFICANT CHANGE UP (ref 10–40)
BASOPHILS # BLD AUTO: 0.01 K/UL — SIGNIFICANT CHANGE UP (ref 0–0.2)
BASOPHILS NFR BLD AUTO: 0.1 % — SIGNIFICANT CHANGE UP (ref 0–2)
BILIRUB SERPL-MCNC: 0.5 MG/DL — SIGNIFICANT CHANGE UP (ref 0.2–1.2)
BUN SERPL-MCNC: 8 MG/DL — SIGNIFICANT CHANGE UP (ref 7–18)
CALCIUM SERPL-MCNC: 8.6 MG/DL — SIGNIFICANT CHANGE UP (ref 8.4–10.5)
CHLORIDE SERPL-SCNC: 108 MMOL/L — SIGNIFICANT CHANGE UP (ref 96–108)
CHOLEST SERPL-MCNC: 130 MG/DL — SIGNIFICANT CHANGE UP (ref 10–199)
CK SERPL-CCNC: 37 U/L — SIGNIFICANT CHANGE UP (ref 21–215)
CO2 SERPL-SCNC: 22 MMOL/L — SIGNIFICANT CHANGE UP (ref 22–31)
CREAT SERPL-MCNC: 0.52 MG/DL — SIGNIFICANT CHANGE UP (ref 0.5–1.3)
CRP SERPL-MCNC: 19.62 MG/DL — HIGH (ref 0–0.4)
EOSINOPHIL # BLD AUTO: 0 K/UL — SIGNIFICANT CHANGE UP (ref 0–0.5)
EOSINOPHIL NFR BLD AUTO: 0 % — SIGNIFICANT CHANGE UP (ref 0–6)
ERYTHROCYTE [SEDIMENTATION RATE] IN BLOOD: 78 MM/HR — HIGH (ref 0–15)
FERRITIN SERPL-MCNC: 297 NG/ML — HIGH (ref 15–150)
FLU A RESULT: SIGNIFICANT CHANGE UP
FLU A RESULT: SIGNIFICANT CHANGE UP
FLUAV AG NPH QL: SIGNIFICANT CHANGE UP
FLUBV AG NPH QL: SIGNIFICANT CHANGE UP
FOLATE SERPL-MCNC: 9.5 NG/ML — SIGNIFICANT CHANGE UP
GLUCOSE SERPL-MCNC: 84 MG/DL — SIGNIFICANT CHANGE UP (ref 70–99)
HBA1C BLD-MCNC: 5.4 % — SIGNIFICANT CHANGE UP (ref 4–5.6)
HCT VFR BLD CALC: 35.3 % — SIGNIFICANT CHANGE UP (ref 34.5–45)
HDLC SERPL-MCNC: 18 MG/DL — LOW
HGB BLD-MCNC: 11.5 G/DL — SIGNIFICANT CHANGE UP (ref 11.5–15.5)
IMM GRANULOCYTES NFR BLD AUTO: 0.5 % — SIGNIFICANT CHANGE UP (ref 0–1.5)
LDH SERPL L TO P-CCNC: 295 U/L — HIGH (ref 120–225)
LIPID PNL WITH DIRECT LDL SERPL: 82 MG/DL — SIGNIFICANT CHANGE UP
LYMPHOCYTES # BLD AUTO: 0.62 K/UL — LOW (ref 1–3.3)
LYMPHOCYTES # BLD AUTO: 5.8 % — LOW (ref 13–44)
MAGNESIUM SERPL-MCNC: 2.2 MG/DL — SIGNIFICANT CHANGE UP (ref 1.6–2.6)
MCHC RBC-ENTMCNC: 28.2 PG — SIGNIFICANT CHANGE UP (ref 27–34)
MCHC RBC-ENTMCNC: 32.6 GM/DL — SIGNIFICANT CHANGE UP (ref 32–36)
MCV RBC AUTO: 86.5 FL — SIGNIFICANT CHANGE UP (ref 80–100)
MONOCYTES # BLD AUTO: 0.26 K/UL — SIGNIFICANT CHANGE UP (ref 0–0.9)
MONOCYTES NFR BLD AUTO: 2.4 % — SIGNIFICANT CHANGE UP (ref 2–14)
NEUTROPHILS # BLD AUTO: 9.78 K/UL — HIGH (ref 1.8–7.4)
NEUTROPHILS NFR BLD AUTO: 91.2 % — HIGH (ref 43–77)
NRBC # BLD: 0 /100 WBCS — SIGNIFICANT CHANGE UP (ref 0–0)
PHOSPHATE SERPL-MCNC: 1.5 MG/DL — LOW (ref 2.5–4.5)
PLATELET # BLD AUTO: 322 K/UL — SIGNIFICANT CHANGE UP (ref 150–400)
POTASSIUM SERPL-MCNC: 3.7 MMOL/L — SIGNIFICANT CHANGE UP (ref 3.5–5.3)
POTASSIUM SERPL-SCNC: 3.7 MMOL/L — SIGNIFICANT CHANGE UP (ref 3.5–5.3)
PROCALCITONIN SERPL-MCNC: 0.33 NG/ML — HIGH (ref 0.02–0.1)
PROT SERPL-MCNC: 7 G/DL — SIGNIFICANT CHANGE UP (ref 6–8.3)
RAPID RVP RESULT: SIGNIFICANT CHANGE UP
RBC # BLD: 4.08 M/UL — SIGNIFICANT CHANGE UP (ref 3.8–5.2)
RBC # FLD: 13.5 % — SIGNIFICANT CHANGE UP (ref 10.3–14.5)
RSV RESULT: SIGNIFICANT CHANGE UP
RSV RNA RESP QL NAA+PROBE: SIGNIFICANT CHANGE UP
SODIUM SERPL-SCNC: 139 MMOL/L — SIGNIFICANT CHANGE UP (ref 135–145)
TOTAL CHOLESTEROL/HDL RATIO MEASUREMENT: 7.2 RATIO — HIGH (ref 3.3–7.1)
TRIGL SERPL-MCNC: 151 MG/DL — HIGH (ref 10–149)
TROPONIN I SERPL-MCNC: <0.015 NG/ML — SIGNIFICANT CHANGE UP (ref 0–0.04)
TSH SERPL-MCNC: 0.71 UU/ML — SIGNIFICANT CHANGE UP (ref 0.34–4.82)
VIT B12 SERPL-MCNC: 760 PG/ML — SIGNIFICANT CHANGE UP (ref 232–1245)
WBC # BLD: 10.72 K/UL — HIGH (ref 3.8–10.5)
WBC # FLD AUTO: 10.72 K/UL — HIGH (ref 3.8–10.5)

## 2020-04-03 PROCEDURE — 99223 1ST HOSP IP/OBS HIGH 75: CPT

## 2020-04-03 PROCEDURE — 99285 EMERGENCY DEPT VISIT HI MDM: CPT

## 2020-04-03 RX ORDER — HYDROXYCHLOROQUINE SULFATE 200 MG
400 TABLET ORAL EVERY 12 HOURS
Refills: 0 | Status: DISCONTINUED | OUTPATIENT
Start: 2020-04-03 | End: 2020-04-04

## 2020-04-03 RX ORDER — MONTELUKAST 4 MG/1
10 TABLET, CHEWABLE ORAL AT BEDTIME
Refills: 0 | Status: DISCONTINUED | OUTPATIENT
Start: 2020-04-03 | End: 2020-04-04

## 2020-04-03 RX ORDER — THIAMINE MONONITRATE (VIT B1) 100 MG
200 TABLET ORAL DAILY
Refills: 0 | Status: DISCONTINUED | OUTPATIENT
Start: 2020-04-03 | End: 2020-04-04

## 2020-04-03 RX ORDER — CEFTRIAXONE 500 MG/1
1000 INJECTION, POWDER, FOR SOLUTION INTRAMUSCULAR; INTRAVENOUS EVERY 24 HOURS
Refills: 0 | Status: DISCONTINUED | OUTPATIENT
Start: 2020-04-03 | End: 2020-04-04

## 2020-04-03 RX ORDER — HYDROXYCHLOROQUINE SULFATE 200 MG
200 TABLET ORAL EVERY 12 HOURS
Refills: 0 | Status: DISCONTINUED | OUTPATIENT
Start: 2020-04-04 | End: 2020-04-04

## 2020-04-03 RX ORDER — HYDROXYCHLOROQUINE SULFATE 200 MG
TABLET ORAL
Refills: 0 | Status: DISCONTINUED | OUTPATIENT
Start: 2020-04-03 | End: 2020-04-04

## 2020-04-03 RX ORDER — AZITHROMYCIN 500 MG/1
250 TABLET, FILM COATED ORAL DAILY
Refills: 0 | Status: DISCONTINUED | OUTPATIENT
Start: 2020-04-03 | End: 2020-04-04

## 2020-04-03 RX ORDER — ASCORBIC ACID 60 MG
1000 TABLET,CHEWABLE ORAL DAILY
Refills: 0 | Status: DISCONTINUED | OUTPATIENT
Start: 2020-04-03 | End: 2020-04-04

## 2020-04-03 RX ORDER — ENOXAPARIN SODIUM 100 MG/ML
40 INJECTION SUBCUTANEOUS DAILY
Refills: 0 | Status: DISCONTINUED | OUTPATIENT
Start: 2020-04-03 | End: 2020-04-03

## 2020-04-03 RX ORDER — SODIUM CHLORIDE 9 MG/ML
1000 INJECTION INTRAMUSCULAR; INTRAVENOUS; SUBCUTANEOUS
Refills: 0 | Status: DISCONTINUED | OUTPATIENT
Start: 2020-04-03 | End: 2020-04-04

## 2020-04-03 RX ADMIN — Medication 400 MILLIGRAM(S): at 09:03

## 2020-04-03 RX ADMIN — SODIUM CHLORIDE 500 MILLILITER(S): 9 INJECTION INTRAMUSCULAR; INTRAVENOUS; SUBCUTANEOUS at 00:29

## 2020-04-03 RX ADMIN — ALBUTEROL 1 PUFF(S): 90 AEROSOL, METERED ORAL at 09:03

## 2020-04-03 RX ADMIN — ALBUTEROL 1 PUFF(S): 90 AEROSOL, METERED ORAL at 14:31

## 2020-04-03 RX ADMIN — ALBUTEROL 1 PUFF(S): 90 AEROSOL, METERED ORAL at 17:17

## 2020-04-03 RX ADMIN — AZITHROMYCIN 250 MILLIGRAM(S): 500 TABLET, FILM COATED ORAL at 11:14

## 2020-04-03 RX ADMIN — SODIUM CHLORIDE 75 MILLILITER(S): 9 INJECTION, SOLUTION INTRAVENOUS at 00:30

## 2020-04-03 RX ADMIN — ALBUTEROL 1 PUFF(S): 90 AEROSOL, METERED ORAL at 02:26

## 2020-04-03 RX ADMIN — Medication 200 MILLIGRAM(S): at 11:14

## 2020-04-03 RX ADMIN — Medication 1000 MILLIGRAM(S): at 11:14

## 2020-04-03 RX ADMIN — ALBUTEROL 1 PUFF(S): 90 AEROSOL, METERED ORAL at 06:50

## 2020-04-03 RX ADMIN — SODIUM CHLORIDE 75 MILLILITER(S): 9 INJECTION INTRAMUSCULAR; INTRAVENOUS; SUBCUTANEOUS at 06:51

## 2020-04-03 NOTE — H&P ADULT - ASSESSMENT
29 y/o female admitted to medicine for hypoxic respiratory failure 2/2 to novel coronavirus infection.

## 2020-04-03 NOTE — H&P ADULT - PROBLEM SELECTOR PLAN 1
O2 sat on admission 95% on room air; currently on nasal cannula  2/2 to COVID-19  CXR shows bilateral infiltrates  empiric abx  supportive care  o2 support as needed

## 2020-04-03 NOTE — H&P ADULT - NSHPPHYSICALEXAM_GEN_ALL_CORE
Vital Signs Last 24 Hrs  T(C): 37.4 (02 Apr 2020 23:22), Max: 39.3 (02 Apr 2020 17:41)  T(F): 99.4 (02 Apr 2020 23:22), Max: 102.7 (02 Apr 2020 17:41)  HR: 91 (02 Apr 2020 23:22) (91 - 130)  BP: 100/60 (02 Apr 2020 23:22) (90/56 - 112/71)  BP(mean): --  RR: 17 (02 Apr 2020 23:22) (17 - 18)  SpO2: 100% (02 Apr 2020 23:22) (95% - 100%)

## 2020-04-03 NOTE — H&P ADULT - ATTENDING COMMENTS
Pt seen and examined. Case discussed with MAR.  Agree with HPI/A/P    Vital Signs Last 24 Hrs  T(C): 37.3 (03 Apr 2020 03:00), Max: 39.3 (02 Apr 2020 17:41)  T(F): 99.2 (03 Apr 2020 03:00), Max: 102.7 (02 Apr 2020 17:41)  HR: 101 (03 Apr 2020 03:00) (91 - 130)  BP: 110/70 (03 Apr 2020 03:00) (90/56 - 112/71)  BP(mean): 80 (03 Apr 2020 03:00) (80 - 80)  RR: 18 (03 Apr 2020 03:00) (17 - 18)  SpO2: 98% (03 Apr 2020 03:00) (95% - 100%)    Exam limited with absence of privacy  Young woman, lying in bed sleeping comfortably at rest Sao2 - 96 % on RA ( dropped below 90% with mild exertion.  CTA B/L RRR S1S2 only    labs                        12.5   11.39 )-----------( 332      ( 02 Apr 2020 20:10 )             37.7     04-02    138  |  108  |  12  ----------------------------<  94  3.9   |  22  |  0.74    Ca    8.7      02 Apr 2020 20:10  Mg     2.0     04-02    TPro  8.1  /  Alb  2.9<L>  /  TBili  0.5  /  DBili  x   /  AST  25  /  ALT  16  /  AlkPhos  57  04-02    CXR - B/L LL PNA      Impression  Acute respiratory failure with hypoxia  Suspected COVID 19 infection with bilateral multilobar pneumonia.  Asthma exacerbation    Plan  Admit to Medicine with COVID 19 isolation protocols  F/up covid 19 pcr; baseline crp /d-dimer /ferritin /l dh /procalcitonin  Empiric antibiotic with Zithromax and ceftriaxone  Plaquenil regimen  Continue supplemental O2 with continuous SaO2 monitor.  Supportive care- antitussive/antipyretics .   Steroid taper; prednisone 40 mg PO daily and slowly taper  MDI inhaler prn

## 2020-04-03 NOTE — H&P ADULT - HISTORY OF PRESENT ILLNESS
29 y/o female with PMHx of asthma and MVP presents to the ED with chief complaint of shortness of breath, cough and fever x1.5 weeks. She has no other complaints.

## 2020-04-03 NOTE — H&P ADULT - NSICDXFAMILYHX_GEN_ALL_CORE_FT
FAMILY HISTORY:  Grandparent  Still living? Unknown  Family history of diabetes mellitus, Age at diagnosis: Age Unknown    Uncle  Still living? Unknown  Family history of diabetes mellitus, Age at diagnosis: Age Unknown

## 2020-04-03 NOTE — H&P ADULT - PROBLEM SELECTOR PLAN 3
IMPROVE VTE Individual Risk Assessment          RISK                                                          Points  [  ] Previous VTE                                                3  [  ] Thrombophilia                                             2  [  ] Lower limb paralysis                                   2        (unable to hold up >15 seconds)    [  ] Current Cancer                                             2         (within 6 months)  [  ] Immobilization > 24 hrs                              1  [  ] ICU/CCU stay > 24 hours                             1  [ ] Age > 60                                                         1    IMPROVE VTE Score: 0    no indication for dvt ppx

## 2020-04-04 ENCOUNTER — OUTPATIENT (OUTPATIENT)
Dept: OUTPATIENT SERVICES | Facility: HOSPITAL | Age: 30
LOS: 1 days | End: 2020-04-04

## 2020-04-04 ENCOUNTER — INPATIENT (INPATIENT)
Facility: HOSPITAL | Age: 30
LOS: 2 days | Discharge: HOME | End: 2020-04-07
Attending: FAMILY MEDICINE
Payer: MEDICAID

## 2020-04-04 PROCEDURE — 82728 ASSAY OF FERRITIN: CPT

## 2020-04-04 PROCEDURE — 80061 LIPID PANEL: CPT

## 2020-04-04 PROCEDURE — 82550 ASSAY OF CK (CPK): CPT

## 2020-04-04 PROCEDURE — 71045 X-RAY EXAM CHEST 1 VIEW: CPT

## 2020-04-04 PROCEDURE — 84484 ASSAY OF TROPONIN QUANT: CPT

## 2020-04-04 PROCEDURE — 83615 LACTATE (LD) (LDH) ENZYME: CPT

## 2020-04-04 PROCEDURE — 36415 COLL VENOUS BLD VENIPUNCTURE: CPT

## 2020-04-04 PROCEDURE — 94640 AIRWAY INHALATION TREATMENT: CPT

## 2020-04-04 PROCEDURE — 82607 VITAMIN B-12: CPT

## 2020-04-04 PROCEDURE — 84145 PROCALCITONIN (PCT): CPT

## 2020-04-04 PROCEDURE — 85027 COMPLETE CBC AUTOMATED: CPT

## 2020-04-04 PROCEDURE — 81001 URINALYSIS AUTO W/SCOPE: CPT

## 2020-04-04 PROCEDURE — 84702 CHORIONIC GONADOTROPIN TEST: CPT

## 2020-04-04 PROCEDURE — 99285 EMERGENCY DEPT VISIT HI MDM: CPT | Mod: 25

## 2020-04-04 PROCEDURE — 84443 ASSAY THYROID STIM HORMONE: CPT

## 2020-04-04 PROCEDURE — 82746 ASSAY OF FOLIC ACID SERUM: CPT

## 2020-04-04 PROCEDURE — 83036 HEMOGLOBIN GLYCOSYLATED A1C: CPT

## 2020-04-04 PROCEDURE — 83605 ASSAY OF LACTIC ACID: CPT

## 2020-04-04 PROCEDURE — 87631 RESP VIRUS 3-5 TARGETS: CPT

## 2020-04-04 PROCEDURE — 87633 RESP VIRUS 12-25 TARGETS: CPT

## 2020-04-04 PROCEDURE — 80053 COMPREHEN METABOLIC PANEL: CPT

## 2020-04-04 PROCEDURE — 86140 C-REACTIVE PROTEIN: CPT

## 2020-04-04 PROCEDURE — 87581 M.PNEUMON DNA AMP PROBE: CPT

## 2020-04-04 PROCEDURE — 83735 ASSAY OF MAGNESIUM: CPT

## 2020-04-04 PROCEDURE — 84100 ASSAY OF PHOSPHORUS: CPT

## 2020-04-04 PROCEDURE — 87486 CHLMYD PNEUM DNA AMP PROBE: CPT

## 2020-04-04 PROCEDURE — 87798 DETECT AGENT NOS DNA AMP: CPT

## 2020-04-04 PROCEDURE — 71045 X-RAY EXAM CHEST 1 VIEW: CPT | Mod: 26

## 2020-04-04 PROCEDURE — 85652 RBC SED RATE AUTOMATED: CPT

## 2020-04-04 RX ORDER — AZITHROMYCIN 500 MG/1
1 TABLET, FILM COATED ORAL
Qty: 0 | Refills: 0 | DISCHARGE
Start: 2020-04-04

## 2020-04-04 RX ORDER — MONTELUKAST 4 MG/1
1 TABLET, CHEWABLE ORAL
Qty: 0 | Refills: 0 | DISCHARGE

## 2020-04-04 RX ORDER — MONTELUKAST 4 MG/1
1 TABLET, CHEWABLE ORAL
Qty: 0 | Refills: 0 | DISCHARGE
Start: 2020-04-04

## 2020-04-04 RX ORDER — ALBUTEROL 90 UG/1
1 AEROSOL, METERED ORAL
Qty: 0 | Refills: 0 | DISCHARGE
Start: 2020-04-04

## 2020-04-04 RX ORDER — HYDROXYCHLOROQUINE SULFATE 200 MG
200 TABLET ORAL
Qty: 0 | Refills: 0 | DISCHARGE
Start: 2020-04-04

## 2020-04-04 RX ORDER — ASCORBIC ACID 60 MG
1 TABLET,CHEWABLE ORAL
Qty: 0 | Refills: 0 | DISCHARGE
Start: 2020-04-04

## 2020-04-04 RX ORDER — THIAMINE MONONITRATE (VIT B1) 100 MG
2 TABLET ORAL
Qty: 0 | Refills: 0 | DISCHARGE
Start: 2020-04-04

## 2020-04-04 RX ORDER — CEFTRIAXONE 500 MG/1
1 INJECTION, POWDER, FOR SOLUTION INTRAMUSCULAR; INTRAVENOUS
Qty: 0 | Refills: 0 | DISCHARGE
Start: 2020-04-04

## 2020-04-04 NOTE — ACUTE INTERFACILITY TRANSFER NOTE - HOSPITAL COURSE
29 y/o female with PMHs of asthma admitted to medicine for acute hypoxic respiratory failure 2/2 to novel coronavirus infection. Pt was tested for COVID -19 on 3/30 /20 which was positive.   Saturating9 8%  on NC 2l/min   started Azithromycin and ceftriaxone for CAP and plaquenil for hypoxia. pt had 2 doses of 400mg of plaquenil in the hospital

## 2020-04-04 NOTE — ACUTE INTERFACILITY TRANSFER NOTE - PLAN OF CARE
free of hypoxia c/w O2 support  monitor O2 saturation free of fever c/w Azithromycin and ceftriaxone / plaquenil 200mg q 12hours x 8 doses   monitor O2 sat and V/S free of exacerbation can consider steroids taper for worsening of wheezing   monitor res distress

## 2020-04-04 NOTE — ACUTE INTERFACILITY TRANSFER NOTE - NS MD INTERFACILITY TRANSFER INST FT
Due to high hospital capacity related to COVID-19  pandemic and patient's continued need for acute care in the hospital setting, per decision of hospital administration, patient was transferred to another [facility] for continued medical care.  Due to the transfer, I was not able to see this patient on day of transfer.

## 2020-04-05 ENCOUNTER — OUTPATIENT (OUTPATIENT)
Dept: OUTPATIENT SERVICES | Facility: HOSPITAL | Age: 30
LOS: 1 days | End: 2020-04-05

## 2020-04-06 ENCOUNTER — OUTPATIENT (OUTPATIENT)
Dept: OUTPATIENT SERVICES | Facility: HOSPITAL | Age: 30
LOS: 1 days | End: 2020-04-06

## 2020-04-11 DIAGNOSIS — Z86.19 PERSONAL HISTORY OF OTHER INFECTIOUS AND PARASITIC DISEASES: ICD-10-CM

## 2020-12-23 PROBLEM — Z86.19 HISTORY OF CANDIDAL VULVOVAGINITIS: Status: RESOLVED | Noted: 2020-04-11 | Resolved: 2020-12-23

## 2020-12-29 PROBLEM — I34.1 NONRHEUMATIC MITRAL (VALVE) PROLAPSE: Chronic | Status: ACTIVE | Noted: 2020-04-03

## 2021-01-25 ENCOUNTER — APPOINTMENT (OUTPATIENT)
Dept: OBGYN | Facility: CLINIC | Age: 31
End: 2021-01-25
Payer: MEDICAID

## 2021-01-25 VITALS
BODY MASS INDEX: 25.18 KG/M2 | DIASTOLIC BLOOD PRESSURE: 72 MMHG | TEMPERATURE: 97.9 F | SYSTOLIC BLOOD PRESSURE: 121 MMHG | WEIGHT: 170 LBS | HEART RATE: 90 BPM | HEIGHT: 69 IN | OXYGEN SATURATION: 98 %

## 2021-01-25 PROCEDURE — 99072 ADDL SUPL MATRL&STAF TM PHE: CPT

## 2021-01-25 PROCEDURE — 99213 OFFICE O/P EST LOW 20 MIN: CPT

## 2021-01-25 RX ORDER — LORATADINE 10 MG/1
10 TABLET ORAL
Qty: 30 | Refills: 0 | Status: ACTIVE | COMMUNITY
Start: 2020-08-19

## 2021-01-25 RX ORDER — FOLIC ACID 1 MG/1
1 TABLET ORAL
Qty: 30 | Refills: 0 | Status: COMPLETED | COMMUNITY
Start: 2020-08-19

## 2021-01-25 RX ORDER — MUPIROCIN 20 MG/G
2 OINTMENT TOPICAL
Qty: 22 | Refills: 0 | Status: COMPLETED | COMMUNITY
Start: 2020-11-13

## 2021-01-25 RX ORDER — NORGESTIMATE AND ETHINYL ESTRADIOL 7DAYSX3 28
0.18/0.215/0.25 KIT ORAL DAILY
Qty: 1 | Refills: 5 | Status: COMPLETED | COMMUNITY
Start: 2020-02-04 | End: 2021-01-25

## 2021-01-25 RX ORDER — MONTELUKAST 10 MG/1
10 TABLET, FILM COATED ORAL
Qty: 30 | Refills: 0 | Status: ACTIVE | COMMUNITY
Start: 2020-07-21

## 2021-01-25 RX ORDER — NAPROXEN 250 MG/1
250 TABLET ORAL
Qty: 28 | Refills: 0 | Status: COMPLETED | COMMUNITY
Start: 2020-11-20

## 2021-01-25 RX ORDER — CLINDAMYCIN HYDROCHLORIDE 300 MG/1
300 CAPSULE ORAL
Qty: 30 | Refills: 0 | Status: COMPLETED | COMMUNITY
Start: 2020-11-20

## 2021-01-25 NOTE — PHYSICAL EXAM
[Appropriately responsive] : appropriately responsive [Alert] : alert [No Lymphadenopathy] : no lymphadenopathy [No Acute Distress] : no acute distress [Regular Rate Rhythm] : regular rate rhythm [No Murmurs] : no murmurs [Clear to Auscultation B/L] : clear to auscultation bilaterally [Soft] : soft [Non-tender] : non-tender [Non-distended] : non-distended [No HSM] : No HSM [No Lesions] : no lesions [No Mass] : no mass [Oriented x3] : oriented x3 [Examination Of The Breasts] : a normal appearance [No Masses] : no breast masses were palpable [Normal] : normal [Discharge] : discharge [Moderate] : moderate [Foul Smelling] : not foul smelling [Tangela] : yellow [Curds] : curd-like [Motion Tenderness] : motion tenderness [Normal Position] : in a normal position [Tenderness] : tender

## 2021-01-25 NOTE — HISTORY OF PRESENT ILLNESS
[Normal Amount/Duration] :  normal amount and duration [Regular Cycle Intervals] : periods have been regular [Menarche Age: ____] : age at menarche was [unfilled] [FreeTextEntry1] : 01/12/2021 [No] : Patient does not have concerns regarding sex [Previously active] : previously active [Men] : men [Vaginal] : vaginal [Yes] : Yes

## 2021-01-26 LAB
CANDIDA VAG CYTO: NOT DETECTED
G VAGINALIS+PREV SP MTYP VAG QL MICRO: NOT DETECTED
T VAGINALIS VAG QL WET PREP: NOT DETECTED

## 2021-01-27 LAB
C TRACH RRNA SPEC QL NAA+PROBE: NOT DETECTED
N GONORRHOEA RRNA SPEC QL NAA+PROBE: NOT DETECTED
SOURCE AMPLIFICATION: NORMAL

## 2021-03-22 ENCOUNTER — APPOINTMENT (OUTPATIENT)
Dept: OBGYN | Facility: CLINIC | Age: 31
End: 2021-03-22

## 2021-03-22 ENCOUNTER — APPOINTMENT (OUTPATIENT)
Dept: OBGYN | Facility: CLINIC | Age: 31
End: 2021-03-22
Payer: MEDICAID

## 2021-03-22 VITALS
BODY MASS INDEX: 26.07 KG/M2 | HEART RATE: 82 BPM | TEMPERATURE: 97.7 F | OXYGEN SATURATION: 98 % | WEIGHT: 176 LBS | DIASTOLIC BLOOD PRESSURE: 79 MMHG | HEIGHT: 69 IN | SYSTOLIC BLOOD PRESSURE: 131 MMHG

## 2021-03-22 PROCEDURE — 99213 OFFICE O/P EST LOW 20 MIN: CPT

## 2021-03-22 PROCEDURE — 99072 ADDL SUPL MATRL&STAF TM PHE: CPT

## 2021-03-22 RX ORDER — BUDESONIDE AND FORMOTEROL FUMARATE DIHYDRATE 160; 4.5 UG/1; UG/1
160-4.5 AEROSOL RESPIRATORY (INHALATION)
Refills: 0 | Status: COMPLETED | COMMUNITY
End: 2021-03-22

## 2021-03-22 RX ORDER — ERGOCALCIFEROL 1.25 MG/1
1.25 MG CAPSULE, LIQUID FILLED ORAL
Qty: 4 | Refills: 0 | Status: COMPLETED | COMMUNITY
Start: 2020-07-28 | End: 2021-03-22

## 2021-03-22 NOTE — HISTORY OF PRESENT ILLNESS
[Normal Amount/Duration] :  normal amount and duration [Regular Cycle Intervals] : periods have been regular [Menarche Age: ____] : age at menarche was [unfilled] [FreeTextEntry1] : 03/07/2021 [Currently Active] : currently active [Men] : men [Vaginal] : vaginal [No] : No

## 2021-03-22 NOTE — PLAN
[FreeTextEntry1] : risks&benefits of ocps explained to patient.she understands her condition&agreed to use pills

## 2022-04-08 ENCOUNTER — EMERGENCY (EMERGENCY)
Facility: HOSPITAL | Age: 32
LOS: 1 days | Discharge: HOME | End: 2022-04-08
Payer: MEDICAID

## 2022-04-08 ENCOUNTER — EMERGENCY (EMERGENCY)
Facility: HOSPITAL | Age: 32
LOS: 1 days | End: 2022-04-08
Attending: STUDENT IN AN ORGANIZED HEALTH CARE EDUCATION/TRAINING PROGRAM
Payer: MEDICAID

## 2022-04-08 VITALS
TEMPERATURE: 99 F | WEIGHT: 176.37 LBS | HEART RATE: 83 BPM | RESPIRATION RATE: 19 BRPM | OXYGEN SATURATION: 98 % | HEIGHT: 70 IN | DIASTOLIC BLOOD PRESSURE: 82 MMHG | SYSTOLIC BLOOD PRESSURE: 143 MMHG

## 2022-04-08 PROCEDURE — 99283 EMERGENCY DEPT VISIT LOW MDM: CPT

## 2022-04-08 PROCEDURE — L9981: CPT

## 2022-04-08 RX ORDER — OXYCODONE AND ACETAMINOPHEN 5; 325 MG/1; MG/1
1 TABLET ORAL ONCE
Refills: 0 | Status: DISCONTINUED | OUTPATIENT
Start: 2022-04-08 | End: 2022-04-08

## 2022-04-08 RX ADMIN — OXYCODONE AND ACETAMINOPHEN 1 TABLET(S): 5; 325 TABLET ORAL at 18:19

## 2022-04-08 RX ADMIN — Medication 300 MILLIGRAM(S): at 18:18

## 2022-04-08 NOTE — ED PROVIDER NOTE - CLINICAL SUMMARY MEDICAL DECISION MAKING FREE TEXT BOX
Patient presents to the ED with complaints of dental pain and infection. Will give infectious treatment and have patient follow up with dental in 5 days. Will also give return precautions.

## 2022-04-08 NOTE — ED PROVIDER NOTE - OBJECTIVE STATEMENT
32 year old female presents to the ED with complaints of left tooth pain for 1 month. States due to an insurance issue, she has not seen a dentist for root canal. Endorses pain, worsening for the past day. Reports gum swelling.   Allergies: Penicillin

## 2022-04-08 NOTE — ED ADULT NURSE NOTE - NSIMPLEMENTINTERV_GEN_ALL_ED
Implemented All Universal Safety Interventions:  [place] to call system. Call bell, personal items and telephone within reach. Instruct patient to call for assistance. Room bathroom lighting operational. Non-slip footwear when patient is off stretcher. Physically safe environment: no spills, clutter or unnecessary equipment. Stretcher in lowest position, wheels locked, appropriate side rails in place.

## 2022-04-19 ENCOUNTER — EMERGENCY (EMERGENCY)
Facility: HOSPITAL | Age: 32
LOS: 1 days | Discharge: HOME | End: 2022-04-19
Attending: EMERGENCY MEDICINE
Payer: MEDICAID

## 2022-04-19 VITALS
DIASTOLIC BLOOD PRESSURE: 70 MMHG | HEART RATE: 85 BPM | WEIGHT: 178.57 LBS | OXYGEN SATURATION: 100 % | RESPIRATION RATE: 16 BRPM | SYSTOLIC BLOOD PRESSURE: 130 MMHG | HEIGHT: 70 IN | TEMPERATURE: 98 F

## 2022-04-19 LAB
ALBUMIN SERPL ELPH-MCNC: 4 G/DL — SIGNIFICANT CHANGE UP (ref 3.5–5)
ALP SERPL-CCNC: 54 U/L — SIGNIFICANT CHANGE UP (ref 40–120)
ALT FLD-CCNC: 34 U/L DA — SIGNIFICANT CHANGE UP (ref 10–60)
ANION GAP SERPL CALC-SCNC: 6 MMOL/L — SIGNIFICANT CHANGE UP (ref 5–17)
APPEARANCE UR: CLEAR — SIGNIFICANT CHANGE UP
AST SERPL-CCNC: 30 U/L — SIGNIFICANT CHANGE UP (ref 10–40)
BASOPHILS # BLD AUTO: 0.04 K/UL — SIGNIFICANT CHANGE UP (ref 0–0.2)
BASOPHILS NFR BLD AUTO: 0.5 % — SIGNIFICANT CHANGE UP (ref 0–2)
BILIRUB SERPL-MCNC: 0.3 MG/DL — SIGNIFICANT CHANGE UP (ref 0.2–1.2)
BILIRUB UR-MCNC: NEGATIVE — SIGNIFICANT CHANGE UP
BUN SERPL-MCNC: 11 MG/DL — SIGNIFICANT CHANGE UP (ref 7–18)
CALCIUM SERPL-MCNC: 9.4 MG/DL — SIGNIFICANT CHANGE UP (ref 8.4–10.5)
CHLORIDE SERPL-SCNC: 105 MMOL/L — SIGNIFICANT CHANGE UP (ref 96–108)
CO2 SERPL-SCNC: 27 MMOL/L — SIGNIFICANT CHANGE UP (ref 22–31)
COLOR SPEC: YELLOW — SIGNIFICANT CHANGE UP
CREAT SERPL-MCNC: 0.58 MG/DL — SIGNIFICANT CHANGE UP (ref 0.5–1.3)
DIFF PNL FLD: NEGATIVE — SIGNIFICANT CHANGE UP
EGFR: 122 ML/MIN/1.73M2 — SIGNIFICANT CHANGE UP
EOSINOPHIL # BLD AUTO: 0.27 K/UL — SIGNIFICANT CHANGE UP (ref 0–0.5)
EOSINOPHIL NFR BLD AUTO: 3.2 % — SIGNIFICANT CHANGE UP (ref 0–6)
GLUCOSE SERPL-MCNC: 94 MG/DL — SIGNIFICANT CHANGE UP (ref 70–99)
GLUCOSE UR QL: NEGATIVE — SIGNIFICANT CHANGE UP
HCT VFR BLD CALC: 38.7 % — SIGNIFICANT CHANGE UP (ref 34.5–45)
HGB BLD-MCNC: 13 G/DL — SIGNIFICANT CHANGE UP (ref 11.5–15.5)
HIV 1 & 2 AB SERPL IA.RAPID: SIGNIFICANT CHANGE UP
IMM GRANULOCYTES NFR BLD AUTO: 0.1 % — SIGNIFICANT CHANGE UP (ref 0–1.5)
KETONES UR-MCNC: NEGATIVE — SIGNIFICANT CHANGE UP
LEUKOCYTE ESTERASE UR-ACNC: NEGATIVE — SIGNIFICANT CHANGE UP
LYMPHOCYTES # BLD AUTO: 2.99 K/UL — SIGNIFICANT CHANGE UP (ref 1–3.3)
LYMPHOCYTES # BLD AUTO: 34.9 % — SIGNIFICANT CHANGE UP (ref 13–44)
MCHC RBC-ENTMCNC: 29.2 PG — SIGNIFICANT CHANGE UP (ref 27–34)
MCHC RBC-ENTMCNC: 33.6 GM/DL — SIGNIFICANT CHANGE UP (ref 32–36)
MCV RBC AUTO: 87 FL — SIGNIFICANT CHANGE UP (ref 80–100)
MONOCYTES # BLD AUTO: 0.62 K/UL — SIGNIFICANT CHANGE UP (ref 0–0.9)
MONOCYTES NFR BLD AUTO: 7.2 % — SIGNIFICANT CHANGE UP (ref 2–14)
NEUTROPHILS # BLD AUTO: 4.64 K/UL — SIGNIFICANT CHANGE UP (ref 1.8–7.4)
NEUTROPHILS NFR BLD AUTO: 54.1 % — SIGNIFICANT CHANGE UP (ref 43–77)
NITRITE UR-MCNC: NEGATIVE — SIGNIFICANT CHANGE UP
NRBC # BLD: 0 /100 WBCS — SIGNIFICANT CHANGE UP (ref 0–0)
PH UR: 6.5 — SIGNIFICANT CHANGE UP (ref 5–8)
PLATELET # BLD AUTO: 338 K/UL — SIGNIFICANT CHANGE UP (ref 150–400)
POTASSIUM SERPL-MCNC: 3.9 MMOL/L — SIGNIFICANT CHANGE UP (ref 3.5–5.3)
POTASSIUM SERPL-SCNC: 3.9 MMOL/L — SIGNIFICANT CHANGE UP (ref 3.5–5.3)
PROT SERPL-MCNC: 7.4 G/DL — SIGNIFICANT CHANGE UP (ref 6–8.3)
PROT UR-MCNC: NEGATIVE — SIGNIFICANT CHANGE UP
RBC # BLD: 4.45 M/UL — SIGNIFICANT CHANGE UP (ref 3.8–5.2)
RBC # FLD: 12.7 % — SIGNIFICANT CHANGE UP (ref 10.3–14.5)
SARS-COV-2 RNA SPEC QL NAA+PROBE: SIGNIFICANT CHANGE UP
SODIUM SERPL-SCNC: 138 MMOL/L — SIGNIFICANT CHANGE UP (ref 135–145)
SP GR SPEC: 1 — LOW (ref 1.01–1.02)
TROPONIN I, HIGH SENSITIVITY RESULT: <3 NG/L — SIGNIFICANT CHANGE UP
UROBILINOGEN FLD QL: NEGATIVE — SIGNIFICANT CHANGE UP
WBC # BLD: 8.57 K/UL — SIGNIFICANT CHANGE UP (ref 3.8–10.5)
WBC # FLD AUTO: 8.57 K/UL — SIGNIFICANT CHANGE UP (ref 3.8–10.5)

## 2022-04-19 PROCEDURE — 81003 URINALYSIS AUTO W/O SCOPE: CPT

## 2022-04-19 PROCEDURE — 86703 HIV-1/HIV-2 1 RESULT ANTBDY: CPT

## 2022-04-19 PROCEDURE — 36415 COLL VENOUS BLD VENIPUNCTURE: CPT

## 2022-04-19 PROCEDURE — 87635 SARS-COV-2 COVID-19 AMP PRB: CPT

## 2022-04-19 PROCEDURE — 99285 EMERGENCY DEPT VISIT HI MDM: CPT | Mod: 25

## 2022-04-19 PROCEDURE — 85025 COMPLETE CBC W/AUTO DIFF WBC: CPT

## 2022-04-19 PROCEDURE — 93005 ELECTROCARDIOGRAM TRACING: CPT

## 2022-04-19 PROCEDURE — 99285 EMERGENCY DEPT VISIT HI MDM: CPT

## 2022-04-19 PROCEDURE — 84484 ASSAY OF TROPONIN QUANT: CPT

## 2022-04-19 PROCEDURE — 71046 X-RAY EXAM CHEST 2 VIEWS: CPT

## 2022-04-19 PROCEDURE — 71046 X-RAY EXAM CHEST 2 VIEWS: CPT | Mod: 26

## 2022-04-19 PROCEDURE — 80053 COMPREHEN METABOLIC PANEL: CPT

## 2022-04-19 RX ORDER — SODIUM CHLORIDE 9 MG/ML
1000 INJECTION INTRAMUSCULAR; INTRAVENOUS; SUBCUTANEOUS ONCE
Refills: 0 | Status: COMPLETED | OUTPATIENT
Start: 2022-04-19 | End: 2022-04-19

## 2022-04-19 RX ORDER — ASPIRIN/CALCIUM CARB/MAGNESIUM 324 MG
162 TABLET ORAL ONCE
Refills: 0 | Status: COMPLETED | OUTPATIENT
Start: 2022-04-19 | End: 2022-04-19

## 2022-04-19 RX ADMIN — Medication 162 MILLIGRAM(S): at 21:08

## 2022-04-19 RX ADMIN — SODIUM CHLORIDE 1000 MILLILITER(S): 9 INJECTION INTRAMUSCULAR; INTRAVENOUS; SUBCUTANEOUS at 21:09

## 2022-04-19 RX ADMIN — Medication 1 MILLIGRAM(S): at 21:08

## 2022-04-19 NOTE — ED PROVIDER NOTE - PATIENT PORTAL LINK FT
You can access the FollowMyHealth Patient Portal offered by [facility] by registering at the following website: [email or web address]. By joining [employer]’s FollowMyHealth portal, you will also be able to view your health information using other applications (apps) compatible with our system.

## 2022-04-19 NOTE — ED PROVIDER NOTE - OBJECTIVE STATEMENT
33 y/o female with PMHx pulmonary stenosis, asthma presents to ED c/o chest pain. Patient states chest tightness and shakiness x last week. Patient states feels like anxiety but has never had panic attack prior. Patient denies ETOH use. NKDA.

## 2022-04-19 NOTE — ED ADULT NURSE NOTE - HIV OFFER
no rashes , no suspicious lesions , no areas of discoloration , no jaundice present , good turgor , no masses , no tenderness on palpation
stretcher
Previously Negative (within the last year)

## 2022-04-19 NOTE — ED PROVIDER NOTE - NSFOLLOWUPINSTRUCTIONS_ED_ALL_ED_FT
Managing Anxiety, Adult      After being diagnosed with an anxiety disorder, you may be relieved to know why you have felt or behaved a certain way. You may also feel overwhelmed about the treatment ahead and what it will mean for your life. With care and support, you can manage this condition and recover from it.      How to manage lifestyle changes      Managing stress and anxiety      Stress is your body's reaction to life changes and events, both good and bad. Most stress will last just a few hours, but stress can be ongoing and can lead to more than just stress. Although stress can play a major role in anxiety, it is not the same as anxiety. Stress is usually caused by something external, such as a deadline, test, or competition. Stress normally passes after the triggering event has ended.     Anxiety is caused by something internal, such as imagining a terrible outcome or worrying that something will go wrong that will devastate you. Anxiety often does not go away even after the triggering event is over, and it can become long-term (chronic) worry. It is important to understand the differences between stress and anxiety and to manage your stress effectively so that it does not lead to an anxious response.    Talk with your health care provider or a counselor to learn more about reducing anxiety and stress. He or she may suggest tension reduction techniques, such as:  •Music therapy. This can include creating or listening to music that you enjoy and that inspires you.      •Mindfulness-based meditation. This involves being aware of your normal breaths while not trying to control your breathing. It can be done while sitting or walking.      •Centering prayer. This involves focusing on a word, phrase, or sacred image that means something to you and brings you peace.      •Deep breathing. To do this, expand your stomach and inhale slowly through your nose. Hold your breath for 3–5 seconds. Then exhale slowly, letting your stomach muscles relax.      •Self-talk. This involves identifying thought patterns that lead to anxiety reactions and changing those patterns.      •Muscle relaxation. This involves tensing muscles and then relaxing them.      Choose a tension reduction technique that suits your lifestyle and personality. These techniques take time and practice. Set aside 5–15 minutes a day to do them. Therapists can offer counseling and training in these techniques. The training to help with anxiety may be covered by some insurance plans. Other things you can do to manage stress and anxiety include:  •Keeping a stress/anxiety diary. This can help you learn what triggers your reaction and then learn ways to manage your response.      •Thinking about how you react to certain situations. You may not be able to control everything, but you can control your response.      •Making time for activities that help you relax and not feeling guilty about spending your time in this way.      •Visual imagery and yoga can help you stay calm and relax.      Medicines     Medicines can help ease symptoms. Medicines for anxiety include:  •Anti-anxiety drugs.      •Antidepressants.      Medicines are often used as a primary treatment for anxiety disorder. Medicines will be prescribed by a health care provider. When used together, medicines, psychotherapy, and tension reduction techniques may be the most effective treatment.    Relationships    Relationships can play a big part in helping you recover. Try to spend more time connecting with trusted friends and family members. Consider going to couples counseling, taking family education classes, or going to family therapy. Therapy can help you and others better understand your condition.      How to recognize changes in your anxiety    Everyone responds differently to treatment for anxiety. Recovery from anxiety happens when symptoms decrease and stop interfering with your daily activities at home or work. This may mean that you will start to:  •Have better concentration and focus. Worry will interfere less in your daily thinking.      •Sleep better.      •Be less irritable.      •Have more energy.      •Have improved memory.      It is important to recognize when your condition is getting worse. Contact your health care provider if your symptoms interfere with home or work and you feel like your condition is not improving.      Follow these instructions at home:    Activity   •Exercise. Most adults should do the following:  •Exercise for at least 150 minutes each week. The exercise should increase your heart rate and make you sweat (moderate-intensity exercise).      •Strengthening exercises at least twice a week.        •Get the right amount and quality of sleep. Most adults need 7–9 hours of sleep each night.        Lifestyle      •Eat a healthy diet that includes plenty of vegetables, fruits, whole grains, low-fat dairy products, and lean protein. Do not eat a lot of foods that are high in solid fats, added sugars, or salt.      •Make choices that simplify your life.      • Do not use any products that contain nicotine or tobacco, such as cigarettes, e-cigarettes, and chewing tobacco. If you need help quitting, ask your health care provider.      •Avoid caffeine, alcohol, and certain over-the-counter cold medicines. These may make you feel worse. Ask your pharmacist which medicines to avoid.      General instructions     •Take over-the-counter and prescription medicines only as told by your health care provider.      •Keep all follow-up visits as told by your health care provider. This is important.        Where to find support    You can get help and support from these sources:  •Self-help groups.      •Online and community organizations.      •A trusted spiritual leader.      •Couples counseling.      •Family education classes.      •Family therapy.        Where to find more information    You may find that joining a support group helps you deal with your anxiety. The following sources can help you locate counselors or support groups near you:  •M[employer] Health [place]: www.mentalhealth[place]ica.net      •Anxiety and Depression Association of [place] (ADAA): www.adaa.org      •National [place] [employer] Mental Illness (DHAVAL): www.dhaval.org        Contact a health care provider if you:    •Have a hard time staying focused or finishing daily tasks.      •Spend many hours a day feeling worried about everyday life.      •Become exhausted by worry.      •Start to have headaches, feel tense, or have nausea.      •Urinate more than normal.      •Have diarrhea.        Get help right away if you have:    •A racing heart and shortness of breath.      •Thoughts of hurting yourself or others.      If you ever feel like you may hurt yourself or others, or have thoughts about taking your own life, get help right away. You can go to your nearest emergency department or call:    • Your local emergency services (911 in the [place]       • A suicide crisis helpline, such as the National Suicide Prevention Lifeline at 1-317.319.1311. This is open 24 hours a day.         Summary    •Taking steps to learn and use tension reduction techniques can help calm you and help prevent triggering an anxiety reaction.      •When used together, medicines, psychotherapy, and tension reduction techniques may be the most effective treatment.      •Family, friends, and partners can play a big part in helping you recover from an anxiety disorder.      This information is not intended to replace advice given to you by your health care provider. Make sure you discuss any questions you have with your health care provider.

## 2022-04-19 NOTE — ED PROVIDER NOTE - CCCP TRG CHIEF CMPLNT

## 2022-04-22 ENCOUNTER — EMERGENCY (EMERGENCY)
Facility: HOSPITAL | Age: 32
LOS: 1 days | Discharge: HOME | End: 2022-04-22
Attending: STUDENT IN AN ORGANIZED HEALTH CARE EDUCATION/TRAINING PROGRAM
Payer: MEDICAID

## 2022-04-22 VITALS
HEART RATE: 91 BPM | RESPIRATION RATE: 18 BRPM | DIASTOLIC BLOOD PRESSURE: 71 MMHG | WEIGHT: 175.93 LBS | HEIGHT: 70 IN | OXYGEN SATURATION: 97 % | TEMPERATURE: 98 F | SYSTOLIC BLOOD PRESSURE: 117 MMHG

## 2022-04-22 LAB
ALBUMIN SERPL ELPH-MCNC: 3.7 G/DL — SIGNIFICANT CHANGE UP (ref 3.5–5)
ALP SERPL-CCNC: 41 U/L — SIGNIFICANT CHANGE UP (ref 40–120)
ALT FLD-CCNC: 35 U/L DA — SIGNIFICANT CHANGE UP (ref 10–60)
ANION GAP SERPL CALC-SCNC: 7 MMOL/L — SIGNIFICANT CHANGE UP (ref 5–17)
APPEARANCE UR: CLEAR — SIGNIFICANT CHANGE UP
AST SERPL-CCNC: 24 U/L — SIGNIFICANT CHANGE UP (ref 10–40)
BASOPHILS # BLD AUTO: 0.02 K/UL — SIGNIFICANT CHANGE UP (ref 0–0.2)
BASOPHILS NFR BLD AUTO: 0.2 % — SIGNIFICANT CHANGE UP (ref 0–2)
BILIRUB SERPL-MCNC: 0.6 MG/DL — SIGNIFICANT CHANGE UP (ref 0.2–1.2)
BILIRUB UR-MCNC: NEGATIVE — SIGNIFICANT CHANGE UP
BUN SERPL-MCNC: 9 MG/DL — SIGNIFICANT CHANGE UP (ref 7–18)
CALCIUM SERPL-MCNC: 9.3 MG/DL — SIGNIFICANT CHANGE UP (ref 8.4–10.5)
CHLORIDE SERPL-SCNC: 109 MMOL/L — HIGH (ref 96–108)
CO2 SERPL-SCNC: 24 MMOL/L — SIGNIFICANT CHANGE UP (ref 22–31)
COLOR SPEC: YELLOW — SIGNIFICANT CHANGE UP
CREAT SERPL-MCNC: 0.58 MG/DL — SIGNIFICANT CHANGE UP (ref 0.5–1.3)
DIFF PNL FLD: NEGATIVE — SIGNIFICANT CHANGE UP
EGFR: 122 ML/MIN/1.73M2 — SIGNIFICANT CHANGE UP
EOSINOPHIL # BLD AUTO: 0.08 K/UL — SIGNIFICANT CHANGE UP (ref 0–0.5)
EOSINOPHIL NFR BLD AUTO: 0.8 % — SIGNIFICANT CHANGE UP (ref 0–6)
FLUAV AG NPH QL: SIGNIFICANT CHANGE UP
FLUBV AG NPH QL: SIGNIFICANT CHANGE UP
GLUCOSE SERPL-MCNC: 92 MG/DL — SIGNIFICANT CHANGE UP (ref 70–99)
GLUCOSE UR QL: NEGATIVE — SIGNIFICANT CHANGE UP
HCG SERPL-ACNC: <1 MIU/ML — SIGNIFICANT CHANGE UP
HCT VFR BLD CALC: 38.3 % — SIGNIFICANT CHANGE UP (ref 34.5–45)
HGB BLD-MCNC: 12.8 G/DL — SIGNIFICANT CHANGE UP (ref 11.5–15.5)
IMM GRANULOCYTES NFR BLD AUTO: 0.2 % — SIGNIFICANT CHANGE UP (ref 0–1.5)
KETONES UR-MCNC: ABNORMAL
LEUKOCYTE ESTERASE UR-ACNC: NEGATIVE — SIGNIFICANT CHANGE UP
LYMPHOCYTES # BLD AUTO: 0.74 K/UL — LOW (ref 1–3.3)
LYMPHOCYTES # BLD AUTO: 7.1 % — LOW (ref 13–44)
MAGNESIUM SERPL-MCNC: 1.6 MG/DL — SIGNIFICANT CHANGE UP (ref 1.6–2.6)
MCHC RBC-ENTMCNC: 29.1 PG — SIGNIFICANT CHANGE UP (ref 27–34)
MCHC RBC-ENTMCNC: 33.4 GM/DL — SIGNIFICANT CHANGE UP (ref 32–36)
MCV RBC AUTO: 87 FL — SIGNIFICANT CHANGE UP (ref 80–100)
MONOCYTES # BLD AUTO: 0.6 K/UL — SIGNIFICANT CHANGE UP (ref 0–0.9)
MONOCYTES NFR BLD AUTO: 5.7 % — SIGNIFICANT CHANGE UP (ref 2–14)
NEUTROPHILS # BLD AUTO: 9.02 K/UL — HIGH (ref 1.8–7.4)
NEUTROPHILS NFR BLD AUTO: 86 % — HIGH (ref 43–77)
NITRITE UR-MCNC: NEGATIVE — SIGNIFICANT CHANGE UP
NRBC # BLD: 0 /100 WBCS — SIGNIFICANT CHANGE UP (ref 0–0)
PH UR: 6.5 — SIGNIFICANT CHANGE UP (ref 5–8)
PLATELET # BLD AUTO: 280 K/UL — SIGNIFICANT CHANGE UP (ref 150–400)
POTASSIUM SERPL-MCNC: 3.7 MMOL/L — SIGNIFICANT CHANGE UP (ref 3.5–5.3)
POTASSIUM SERPL-SCNC: 3.7 MMOL/L — SIGNIFICANT CHANGE UP (ref 3.5–5.3)
PROT SERPL-MCNC: 7.1 G/DL — SIGNIFICANT CHANGE UP (ref 6–8.3)
PROT UR-MCNC: NEGATIVE — SIGNIFICANT CHANGE UP
RBC # BLD: 4.4 M/UL — SIGNIFICANT CHANGE UP (ref 3.8–5.2)
RBC # FLD: 12.4 % — SIGNIFICANT CHANGE UP (ref 10.3–14.5)
SARS-COV-2 RNA SPEC QL NAA+PROBE: SIGNIFICANT CHANGE UP
SODIUM SERPL-SCNC: 140 MMOL/L — SIGNIFICANT CHANGE UP (ref 135–145)
SP GR SPEC: 1.01 — SIGNIFICANT CHANGE UP (ref 1.01–1.02)
TROPONIN I, HIGH SENSITIVITY RESULT: 3.9 NG/L — SIGNIFICANT CHANGE UP
UROBILINOGEN FLD QL: NEGATIVE — SIGNIFICANT CHANGE UP
WBC # BLD: 10.48 K/UL — SIGNIFICANT CHANGE UP (ref 3.8–10.5)
WBC # FLD AUTO: 10.48 K/UL — SIGNIFICANT CHANGE UP (ref 3.8–10.5)

## 2022-04-22 PROCEDURE — 84702 CHORIONIC GONADOTROPIN TEST: CPT

## 2022-04-22 PROCEDURE — 85025 COMPLETE CBC W/AUTO DIFF WBC: CPT

## 2022-04-22 PROCEDURE — 84484 ASSAY OF TROPONIN QUANT: CPT

## 2022-04-22 PROCEDURE — 81003 URINALYSIS AUTO W/O SCOPE: CPT

## 2022-04-22 PROCEDURE — 83735 ASSAY OF MAGNESIUM: CPT

## 2022-04-22 PROCEDURE — 87637 SARSCOV2&INF A&B&RSV AMP PRB: CPT

## 2022-04-22 PROCEDURE — 36415 COLL VENOUS BLD VENIPUNCTURE: CPT

## 2022-04-22 PROCEDURE — 99283 EMERGENCY DEPT VISIT LOW MDM: CPT

## 2022-04-22 PROCEDURE — 99285 EMERGENCY DEPT VISIT HI MDM: CPT

## 2022-04-22 PROCEDURE — 80053 COMPREHEN METABOLIC PANEL: CPT

## 2022-04-22 RX ORDER — DIAZEPAM 5 MG
2 TABLET ORAL ONCE
Refills: 0 | Status: DISCONTINUED | OUTPATIENT
Start: 2022-04-22 | End: 2022-04-22

## 2022-04-22 RX ORDER — SODIUM CHLORIDE 9 MG/ML
1000 INJECTION INTRAMUSCULAR; INTRAVENOUS; SUBCUTANEOUS ONCE
Refills: 0 | Status: COMPLETED | OUTPATIENT
Start: 2022-04-22 | End: 2022-04-22

## 2022-04-22 RX ORDER — SODIUM CHLORIDE 9 MG/ML
3 INJECTION INTRAMUSCULAR; INTRAVENOUS; SUBCUTANEOUS EVERY 8 HOURS
Refills: 0 | Status: DISCONTINUED | OUTPATIENT
Start: 2022-04-22 | End: 2022-04-26

## 2022-04-22 RX ADMIN — Medication 2 MILLIGRAM(S): at 23:07

## 2022-04-22 RX ADMIN — SODIUM CHLORIDE 2000 MILLILITER(S): 9 INJECTION INTRAMUSCULAR; INTRAVENOUS; SUBCUTANEOUS at 22:25

## 2022-04-22 RX ADMIN — SODIUM CHLORIDE 1000 MILLILITER(S): 9 INJECTION INTRAMUSCULAR; INTRAVENOUS; SUBCUTANEOUS at 22:26

## 2022-04-22 NOTE — ED PROVIDER NOTE - CHPI ED SYMPTOMS NEG
No infectious symptoms, no urinary symptoms, no chest pain, no shortness of breath,, no syncope/no diarrhea/no fever/no nausea/no vomiting

## 2022-04-22 NOTE — ED PROVIDER NOTE - NSFOLLOWUPCLINICS_GEN_ALL_ED_FT
[facility]  Psychiatry  [address] [place] [address]  Phone: [phone]  Fax:   Follow Up Time: 1-3 Days    [facility]  Psychiatry  [place] [address]  Phone: [phone]  Fax:   Follow Up Time: 1-3 Days

## 2022-04-22 NOTE — ED PROVIDER NOTE - NSFOLLOWUPINSTRUCTIONS_ED_ALL_ED_FT
You were seen in the emergency room today. Please call your primary doctor to inform them of this ER visit and obtain the next available appointment within the next 5 days. As we discussed, return to the ER if you have any worsening symptoms.    We no longer feel that you need further emergency care or admission to the hospital at this time.    While we have determined that you are currently stable for discharge, we know that things can change. Please seek immediate medical attention or return to the ER if you experience any of the following:  Any worsening or persistent symptoms  Severe Pain  Chest Pain  Difficulty Breathing  Bleeding  Passing Out  Severe Rash  Inability to Eat or Drink  Persistent Fever    Please see a primary care doctor or specialist within 5 days to ensure that you are improving.    Please call the [facility] phone numbers on this document if you have any problems obtaining a follow up appointment.    I wish you well! -Dr Rodriguez

## 2022-04-22 NOTE — ED PROVIDER NOTE - OBJECTIVE STATEMENT
32 year old female with PMHx of asthma and Mitral valve prolapse and no significant PSHx presents to the ED with complaints of generalized weakness for 3 days with patients states is caused by stress. Patient reports having lower abdominal discomfort without any associated symptoms. Patient was here at the ED 3 days ago with similar complaint, and had a negative workup at the time. Patient denies any recent fever, infectious symptoms,  nausea, vomiting, diarrhea, chest pain, shortness of breath, syncope, infectious or urinary symptoms or any other recent illness or hospitalizations.   Allergies: Penicillin (Hives)

## 2022-04-22 NOTE — ED PROVIDER NOTE - CLINICAL SUMMARY MEDICAL DECISION MAKING FREE TEXT BOX
Pt p/w generalized weakness that she believes is due to anxiety. No SI/HI/hallucinations. Labs and UA unremarkable. Rec psych f/u. Discussed with pt my clinical impression and results, patient given strict return precautions if persistent or worsening of symptoms occurs, and need for close follow up. Pt expressed understanding and agrees with plan. Pt is well appearing with a reassuring exam. Discharge home with PMD or Specialist f/u within 5 days.

## 2022-04-22 NOTE — ED PROVIDER NOTE - PHYSICAL EXAMINATION
Vital Signs Reviewed  GEN: Comfortable, NAD, AAOx3  HEENT: NCAT, EOMI, MMM, Neck Supple  RESP: CTAB, No rales/rhonchi/wheezing  CV: RRR, S1S2, No murmurs  ABD: No TTP, ND, No masses, No CVA Tenderness  Extrem/Skin: Equal pulses bilat, No cyanosis/edema/rashes  Neuro: No focal deficits, normal gait

## 2022-04-23 VITALS
OXYGEN SATURATION: 99 % | TEMPERATURE: 98 F | DIASTOLIC BLOOD PRESSURE: 67 MMHG | RESPIRATION RATE: 18 BRPM | SYSTOLIC BLOOD PRESSURE: 110 MMHG | HEART RATE: 87 BPM

## 2022-08-18 ENCOUNTER — APPOINTMENT (OUTPATIENT)
Dept: OBGYN | Facility: CLINIC | Age: 32
End: 2022-08-18

## 2022-08-18 ENCOUNTER — TRANSCRIPTION ENCOUNTER (OUTPATIENT)
Age: 32
End: 2022-08-18

## 2022-08-18 VITALS
DIASTOLIC BLOOD PRESSURE: 73 MMHG | WEIGHT: 174 LBS | SYSTOLIC BLOOD PRESSURE: 118 MMHG | OXYGEN SATURATION: 98 % | BODY MASS INDEX: 25.77 KG/M2 | HEART RATE: 77 BPM | TEMPERATURE: 97.4 F | HEIGHT: 69 IN

## 2022-08-18 DIAGNOSIS — Z01.419 ENCOUNTER FOR GYNECOLOGICAL EXAMINATION (GENERAL) (ROUTINE) W/OUT ABNORMAL FINDINGS: ICD-10-CM

## 2022-08-18 PROCEDURE — 99214 OFFICE O/P EST MOD 30 MIN: CPT

## 2022-08-18 RX ORDER — FLUTICASONE FUROATE AND VILANTEROL TRIFENATATE 100; 25 UG/1; UG/1
100-25 POWDER RESPIRATORY (INHALATION)
Qty: 60 | Refills: 0 | Status: COMPLETED | COMMUNITY
Start: 2020-08-19 | End: 2022-08-18

## 2022-08-18 RX ORDER — NORGESTIMATE AND ETHINYL ESTRADIOL 7DAYSX3 LO
0.18/0.215/0.25 KIT ORAL DAILY
Qty: 1 | Refills: 5 | Status: COMPLETED | COMMUNITY
Start: 2021-03-22 | End: 2022-08-18

## 2022-08-18 RX ORDER — FLUCONAZOLE 150 MG/1
150 TABLET ORAL
Qty: 2 | Refills: 2 | Status: COMPLETED | COMMUNITY
Start: 2020-04-11 | End: 2022-08-18

## 2022-08-18 RX ORDER — LEVONORGESTREL 52 MG/1
20 INTRAUTERINE DEVICE INTRAUTERINE
Qty: 1 | Refills: 0 | Status: ACTIVE | COMMUNITY
Start: 2022-08-18 | End: 1900-01-01

## 2022-08-18 NOTE — PHYSICAL EXAM
[Chaperone Present] : A chaperone was present in the examining room during all aspects of the physical examination [Examination Of The Breasts] : a normal appearance [No Masses] : no breast masses were palpable [Normal] : normal [Normal Position] : in a normal position [Uterine Adnexae] : normal

## 2022-08-18 NOTE — HISTORY OF PRESENT ILLNESS
[Normal Amount/Duration] :  normal amount and duration [Regular Cycle Intervals] : periods have been regular [Frequency: Q ___ days] : menstrual periods occur approximately every [unfilled] days [Menarche Age: ____] : age at menarche was [unfilled] [FreeTextEntry1] : 08/10/2022 [Currently Active] : currently active [Men] : men [Vaginal] : vaginal [No] : No

## 2022-08-24 LAB — CYTOLOGY CVX/VAG DOC THIN PREP: NORMAL

## 2022-09-19 ENCOUNTER — APPOINTMENT (OUTPATIENT)
Dept: OBGYN | Facility: CLINIC | Age: 32
End: 2022-09-19
Payer: MEDICAID

## 2022-09-19 VITALS
OXYGEN SATURATION: 98 % | DIASTOLIC BLOOD PRESSURE: 77 MMHG | WEIGHT: 174 LBS | HEART RATE: 94 BPM | BODY MASS INDEX: 25.77 KG/M2 | TEMPERATURE: 97.4 F | HEIGHT: 69 IN | SYSTOLIC BLOOD PRESSURE: 123 MMHG

## 2022-09-19 DIAGNOSIS — Z30.430 ENCOUNTER FOR INSERTION OF INTRAUTERINE CONTRACEPTIVE DEVICE: ICD-10-CM

## 2022-09-19 DIAGNOSIS — Z30.011 ENCOUNTER FOR INITIAL PRESCRIPTION OF CONTRACEPTIVE PILLS: ICD-10-CM

## 2022-09-19 PROCEDURE — 58300 INSERT INTRAUTERINE DEVICE: CPT | Mod: 59

## 2022-09-19 PROCEDURE — 99213 OFFICE O/P EST LOW 20 MIN: CPT | Mod: 25

## 2022-09-19 PROCEDURE — 76830 TRANSVAGINAL US NON-OB: CPT | Mod: 59

## 2022-09-19 PROCEDURE — 81025 URINE PREGNANCY TEST: CPT

## 2022-09-19 RX ORDER — NAPROXEN 375 MG/1
375 TABLET ORAL
Qty: 10 | Refills: 0 | Status: COMPLETED | COMMUNITY
Start: 2022-04-19

## 2022-09-19 RX ORDER — FAMOTIDINE 40 MG/1
40 TABLET, FILM COATED ORAL
Qty: 30 | Refills: 0 | Status: COMPLETED | COMMUNITY
Start: 2022-07-20

## 2022-09-19 RX ORDER — FLUTICASONE PROPIONATE 50 UG/1
50 SPRAY, METERED NASAL
Qty: 18 | Refills: 0 | Status: COMPLETED | COMMUNITY
Start: 2022-03-30

## 2022-09-19 RX ORDER — ESCITALOPRAM OXALATE 10 MG/1
10 TABLET ORAL
Qty: 30 | Refills: 0 | Status: COMPLETED | COMMUNITY
Start: 2022-06-13

## 2022-09-19 RX ORDER — EPINEPHRINE 0.3 MG/.3ML
0.3 INJECTION INTRAMUSCULAR
Qty: 2 | Refills: 0 | Status: ACTIVE | COMMUNITY
Start: 2022-03-30

## 2022-09-19 RX ORDER — MECLIZINE HYDROCHLORIDE 25 MG/1
25 TABLET ORAL
Qty: 30 | Refills: 0 | Status: COMPLETED | COMMUNITY
Start: 2022-04-19

## 2022-09-19 RX ORDER — OXYCODONE AND ACETAMINOPHEN 5; 325 MG/1; MG/1
5-325 TABLET ORAL
Qty: 12 | Refills: 0 | Status: COMPLETED | COMMUNITY
Start: 2022-04-08

## 2022-09-19 RX ORDER — IBUPROFEN 400 MG/1
400 TABLET, FILM COATED ORAL
Qty: 30 | Refills: 0 | Status: COMPLETED | COMMUNITY
Start: 2022-05-16

## 2022-09-19 NOTE — PROCEDURE
[IUD Placement] : intrauterine device (IUD) placement [Time out performed] : Pre-procedure time out performed.  Patient's name, date of birth and procedure confirmed. [Consent Obtained] : Consent obtained [Prevention of Pregnancy] : prevention of pregnancy [Abnormal Uterine Bleeding] : abnormal uterine bleeding [Risks] : risks [Benefits] : benefits [Alternatives] : alternatives [Patient] : patient [Infection] : infection [Bleeding] : bleeding [Pain] : pain [Expulsion] : expulsion [Failure] : failure [Uterine Perforation] : uterine perforation [Neg GC/Chlamydia] : negative GC/Chlamydia [History of Unprotected [place]] : no history of unprotected intercourse [No Premedication] : No premedication [Easy Passage] : Easy passage [Sounded to ___ cm] : sounded to [unfilled] ~Ucm [Mirena IUD] : Mirena IUD [Tolerated Well] : Patient tolerated the procedure well [No Complications] : No complications [None] : None [LMPDate] : 09/10/2022 [de-identified] : transvaginal pelvic sonogram done to cinfirm iud placement intrauterine [de-identified] : [id number] [de-identified] : 04/30/2024 [de-identified] : 09/10/2029

## 2022-10-17 ENCOUNTER — APPOINTMENT (OUTPATIENT)
Dept: OBGYN | Facility: CLINIC | Age: 32
End: 2022-10-17

## 2022-10-17 VITALS
HEIGHT: 69 IN | SYSTOLIC BLOOD PRESSURE: 105 MMHG | BODY MASS INDEX: 25.77 KG/M2 | OXYGEN SATURATION: 97 % | TEMPERATURE: 97.9 F | DIASTOLIC BLOOD PRESSURE: 70 MMHG | WEIGHT: 174 LBS | HEART RATE: 104 BPM

## 2022-10-17 DIAGNOSIS — N92.1 EXCESSIVE AND FREQUENT MENSTRUATION WITH IRREGULAR CYCLE: ICD-10-CM

## 2022-10-17 PROCEDURE — 99213 OFFICE O/P EST LOW 20 MIN: CPT

## 2022-10-17 RX ORDER — TIZANIDINE 4 MG/1
4 TABLET ORAL
Qty: 30 | Refills: 0 | Status: COMPLETED | COMMUNITY
Start: 2022-07-20 | End: 2022-10-17

## 2022-10-17 RX ORDER — ALBUTEROL SULFATE 90 UG/1
108 (90 BASE) INHALANT RESPIRATORY (INHALATION)
Qty: 18 | Refills: 0 | Status: COMPLETED | COMMUNITY
Start: 2020-09-01 | End: 2022-10-17

## 2022-10-17 RX ORDER — OMEPRAZOLE, SODIUM BICARBONATE 20; 1680 MG/1; MG/1
20-1680 POWDER, FOR SUSPENSION ORAL
Qty: 30 | Refills: 0 | Status: COMPLETED | COMMUNITY
Start: 2022-08-25 | End: 2022-10-17

## 2022-10-17 RX ORDER — CARVEDILOL 3.12 MG/1
3.12 TABLET, FILM COATED ORAL
Qty: 30 | Refills: 0 | Status: COMPLETED | COMMUNITY
Start: 2022-07-27 | End: 2022-10-17

## 2022-10-17 NOTE — PHYSICAL EXAM
[Chaperone Present] : A chaperone was present in the examining room during all aspects of the physical examination [Appropriately responsive] : appropriately responsive [Alert] : alert [No Acute Distress] : no acute distress [No Lymphadenopathy] : no lymphadenopathy [Regular Rate Rhythm] : regular rate rhythm [No Murmurs] : no murmurs [Clear to Auscultation B/L] : clear to auscultation bilaterally [Soft] : soft [Non-tender] : non-tender [Non-distended] : non-distended [No HSM] : No HSM [No Lesions] : no lesions [No Mass] : no mass [Oriented x3] : oriented x3 [Examination Of The Breasts] : a normal appearance [No Masses] : no breast masses were palpable [Discharge] : discharge [Scant] : scant [Foul Smelling] : not foul smelling [White] : white [Thin] : thin [IUD String] : an IUD string was noted [Normal] : normal [Normal Position] : in a normal position [Uterine Adnexae] : normal

## 2022-10-17 NOTE — HISTORY OF PRESENT ILLNESS
[Normal Amount/Duration] :  normal amount and duration [Frequency: Q ___ days] : menstrual periods occur approximately every [unfilled] days [FreeTextEntry1] : 10/06/2022 [Currently Active] : currently active [Men] : men [No] : No

## 2022-10-18 NOTE — ED PROVIDER NOTE - ATTENDING CONTRIBUTION TO CARE
Arterial    Diagnosis: spinal cord comressiin    Patient location during procedure: done in OR  Procedure start time: 10/18/2022 7:10 AM  Timeout: 10/18/2022 7:10 AM  Procedure end time: 10/18/2022 7:15 AM    Staffing  Authorizing Provider: Manoj Gibbs MD  Performing Provider: Manoj Gibbs MD    Anesthesiologist was present at the time of the procedure.    Preanesthetic Checklist  Completed: patient identified, IV checked, site marked, risks and benefits discussed, surgical consent, monitors and equipment checked, pre-op evaluation, timeout performed and anesthesia consent givenArterial  Skin Prep: chlorhexidine gluconate  Local Infiltration: none  Orientation: right  Location: radial    Catheter Size: 20 G  Catheter placement by Ultrasound guidance. Heme positive aspiration all ports.   Vessel Caliber: medium, patent  Needle advanced into vessel with real time Ultrasound guidance.Insertion Attempts: 1  Assessment  Dressing: secured with tape and tegaderm           
Intubation    Date/Time: 10/18/2022 7:24 AM  Performed by: Krystal Holman CRNA  Authorized by: Manoj Gibbs MD     Intubation:     Induction:  Intravenous    Intubated:  Postinduction    Mask Ventilation:  Easy mask    Attempts:  1    Attempted By:  Student    Method of Intubation:  Video laryngoscopy    Blade:  Busch 3    Laryngeal View Grade: Grade I - full view of cords      Difficult Airway Encountered?: No      Complications:  None    Airway Device:  Oral endotracheal tube    Airway Device Size:  7.0    Style/Cuff Inflation:  Cuffed (inflated to minimal occlusive pressure)    Tube secured:  22    Secured at:  The lips    Placement Verified By:  Capnometry    Complicating Factors:  None    Findings Post-Intubation:  BS equal bilateral and atraumatic/condition of teeth unchanged    
I have reviewed and agree with the NP note above- HPI, PE, vitals normal no distress normal exam MDM and plan

## 2022-12-17 ENCOUNTER — EMERGENCY (EMERGENCY)
Facility: HOSPITAL | Age: 32
LOS: 1 days | Discharge: HOME | End: 2022-12-17
Attending: EMERGENCY MEDICINE
Payer: MEDICAID

## 2022-12-17 VITALS
RESPIRATION RATE: 17 BRPM | HEART RATE: 77 BPM | DIASTOLIC BLOOD PRESSURE: 83 MMHG | SYSTOLIC BLOOD PRESSURE: 130 MMHG | OXYGEN SATURATION: 99 %

## 2022-12-17 VITALS
HEART RATE: 78 BPM | WEIGHT: 169.98 LBS | DIASTOLIC BLOOD PRESSURE: 86 MMHG | RESPIRATION RATE: 18 BRPM | SYSTOLIC BLOOD PRESSURE: 121 MMHG | TEMPERATURE: 98 F | OXYGEN SATURATION: 99 % | HEIGHT: 69 IN

## 2022-12-17 LAB
ALBUMIN SERPL ELPH-MCNC: 4.1 G/DL — SIGNIFICANT CHANGE UP (ref 3.5–5)
ALP SERPL-CCNC: 46 U/L — SIGNIFICANT CHANGE UP (ref 40–120)
ALT FLD-CCNC: 17 U/L DA — SIGNIFICANT CHANGE UP (ref 10–60)
AMPHET UR-MCNC: NEGATIVE — SIGNIFICANT CHANGE UP
ANION GAP SERPL CALC-SCNC: 6 MMOL/L — SIGNIFICANT CHANGE UP (ref 5–17)
AST SERPL-CCNC: 13 U/L — SIGNIFICANT CHANGE UP (ref 10–40)
BARBITURATES UR SCN-MCNC: NEGATIVE — SIGNIFICANT CHANGE UP
BASOPHILS # BLD AUTO: 0.09 K/UL — SIGNIFICANT CHANGE UP (ref 0–0.2)
BASOPHILS NFR BLD AUTO: 1 % — SIGNIFICANT CHANGE UP (ref 0–2)
BENZODIAZ UR-MCNC: NEGATIVE — SIGNIFICANT CHANGE UP
BILIRUB SERPL-MCNC: 0.4 MG/DL — SIGNIFICANT CHANGE UP (ref 0.2–1.2)
BUN SERPL-MCNC: 15 MG/DL — SIGNIFICANT CHANGE UP (ref 7–18)
CALCIUM SERPL-MCNC: 9.4 MG/DL — SIGNIFICANT CHANGE UP (ref 8.4–10.5)
CHLORIDE SERPL-SCNC: 107 MMOL/L — SIGNIFICANT CHANGE UP (ref 96–108)
CK SERPL-CCNC: 54 U/L — SIGNIFICANT CHANGE UP (ref 21–215)
CO2 SERPL-SCNC: 27 MMOL/L — SIGNIFICANT CHANGE UP (ref 22–31)
COCAINE METAB.OTHER UR-MCNC: NEGATIVE — SIGNIFICANT CHANGE UP
CREAT SERPL-MCNC: 0.72 MG/DL — SIGNIFICANT CHANGE UP (ref 0.5–1.3)
EGFR: 113 ML/MIN/1.73M2 — SIGNIFICANT CHANGE UP
EOSINOPHIL # BLD AUTO: 0.48 K/UL — SIGNIFICANT CHANGE UP (ref 0–0.5)
EOSINOPHIL NFR BLD AUTO: 5.5 % — SIGNIFICANT CHANGE UP (ref 0–6)
GLUCOSE SERPL-MCNC: 93 MG/DL — SIGNIFICANT CHANGE UP (ref 70–99)
HCG SERPL-ACNC: <1 MIU/ML — SIGNIFICANT CHANGE UP
HCT VFR BLD CALC: 40.4 % — SIGNIFICANT CHANGE UP (ref 34.5–45)
HGB BLD-MCNC: 13.3 G/DL — SIGNIFICANT CHANGE UP (ref 11.5–15.5)
IMM GRANULOCYTES NFR BLD AUTO: 0.3 % — SIGNIFICANT CHANGE UP (ref 0–0.9)
LYMPHOCYTES # BLD AUTO: 2.19 K/UL — SIGNIFICANT CHANGE UP (ref 1–3.3)
LYMPHOCYTES # BLD AUTO: 24.9 % — SIGNIFICANT CHANGE UP (ref 13–44)
MAGNESIUM SERPL-MCNC: 2 MG/DL — SIGNIFICANT CHANGE UP (ref 1.6–2.6)
MCHC RBC-ENTMCNC: 29 PG — SIGNIFICANT CHANGE UP (ref 27–34)
MCHC RBC-ENTMCNC: 32.9 GM/DL — SIGNIFICANT CHANGE UP (ref 32–36)
MCV RBC AUTO: 88 FL — SIGNIFICANT CHANGE UP (ref 80–100)
METHADONE UR-MCNC: NEGATIVE — SIGNIFICANT CHANGE UP
MONOCYTES # BLD AUTO: 0.57 K/UL — SIGNIFICANT CHANGE UP (ref 0–0.9)
MONOCYTES NFR BLD AUTO: 6.5 % — SIGNIFICANT CHANGE UP (ref 2–14)
NEUTROPHILS # BLD AUTO: 5.44 K/UL — SIGNIFICANT CHANGE UP (ref 1.8–7.4)
NEUTROPHILS NFR BLD AUTO: 61.8 % — SIGNIFICANT CHANGE UP (ref 43–77)
NRBC # BLD: 0 /100 WBCS — SIGNIFICANT CHANGE UP (ref 0–0)
OPIATES UR-MCNC: NEGATIVE — SIGNIFICANT CHANGE UP
PCP SPEC-MCNC: SIGNIFICANT CHANGE UP
PCP UR-MCNC: NEGATIVE — SIGNIFICANT CHANGE UP
PLATELET # BLD AUTO: 337 K/UL — SIGNIFICANT CHANGE UP (ref 150–400)
POTASSIUM SERPL-MCNC: 3.6 MMOL/L — SIGNIFICANT CHANGE UP (ref 3.5–5.3)
POTASSIUM SERPL-SCNC: 3.6 MMOL/L — SIGNIFICANT CHANGE UP (ref 3.5–5.3)
PROT SERPL-MCNC: 7.4 G/DL — SIGNIFICANT CHANGE UP (ref 6–8.3)
RBC # BLD: 4.59 M/UL — SIGNIFICANT CHANGE UP (ref 3.8–5.2)
RBC # FLD: 12.5 % — SIGNIFICANT CHANGE UP (ref 10.3–14.5)
SODIUM SERPL-SCNC: 140 MMOL/L — SIGNIFICANT CHANGE UP (ref 135–145)
THC UR QL: NEGATIVE — SIGNIFICANT CHANGE UP
TROPONIN I, HIGH SENSITIVITY RESULT: 4 NG/L — SIGNIFICANT CHANGE UP
WBC # BLD: 8.8 K/UL — SIGNIFICANT CHANGE UP (ref 3.8–10.5)
WBC # FLD AUTO: 8.8 K/UL — SIGNIFICANT CHANGE UP (ref 3.8–10.5)

## 2022-12-17 PROCEDURE — 99284 EMERGENCY DEPT VISIT MOD MDM: CPT | Mod: 25

## 2022-12-17 PROCEDURE — 99285 EMERGENCY DEPT VISIT HI MDM: CPT

## 2022-12-17 PROCEDURE — 82550 ASSAY OF CK (CPK): CPT

## 2022-12-17 PROCEDURE — 84702 CHORIONIC GONADOTROPIN TEST: CPT

## 2022-12-17 PROCEDURE — 36415 COLL VENOUS BLD VENIPUNCTURE: CPT

## 2022-12-17 PROCEDURE — 80053 COMPREHEN METABOLIC PANEL: CPT

## 2022-12-17 PROCEDURE — 96365 THER/PROPH/DIAG IV INF INIT: CPT

## 2022-12-17 PROCEDURE — 96361 HYDRATE IV INFUSION ADD-ON: CPT

## 2022-12-17 PROCEDURE — 85025 COMPLETE CBC W/AUTO DIFF WBC: CPT

## 2022-12-17 PROCEDURE — 84484 ASSAY OF TROPONIN QUANT: CPT

## 2022-12-17 PROCEDURE — 93005 ELECTROCARDIOGRAM TRACING: CPT

## 2022-12-17 PROCEDURE — 83735 ASSAY OF MAGNESIUM: CPT

## 2022-12-17 PROCEDURE — 80307 DRUG TEST PRSMV CHEM ANLYZR: CPT

## 2022-12-17 RX ORDER — DIAZEPAM 5 MG
5 TABLET ORAL ONCE
Refills: 0 | Status: DISCONTINUED | OUTPATIENT
Start: 2022-12-17 | End: 2022-12-17

## 2022-12-17 RX ORDER — SODIUM CHLORIDE 9 MG/ML
1000 INJECTION INTRAMUSCULAR; INTRAVENOUS; SUBCUTANEOUS
Refills: 0 | Status: DISCONTINUED | OUTPATIENT
Start: 2022-12-17 | End: 2022-12-21

## 2022-12-17 RX ORDER — METOCLOPRAMIDE HCL 10 MG
10 TABLET ORAL ONCE
Refills: 0 | Status: COMPLETED | OUTPATIENT
Start: 2022-12-17 | End: 2022-12-17

## 2022-12-17 RX ADMIN — SODIUM CHLORIDE 200 MILLILITER(S): 9 INJECTION INTRAMUSCULAR; INTRAVENOUS; SUBCUTANEOUS at 15:44

## 2022-12-17 RX ADMIN — Medication 104 MILLIGRAM(S): at 16:16

## 2022-12-17 RX ADMIN — SODIUM CHLORIDE 1000 MILLILITER(S): 9 INJECTION INTRAMUSCULAR; INTRAVENOUS; SUBCUTANEOUS at 17:29

## 2022-12-17 RX ADMIN — Medication 10 MILLIGRAM(S): at 16:33

## 2022-12-17 RX ADMIN — Medication 5 MILLIGRAM(S): at 15:44

## 2022-12-17 NOTE — ED PROVIDER NOTE - PROGRESS NOTE DETAILS
pt claims she is feeling much better, pt with no HA, dizziness, ambulating with no ataxia.  Pt has an appt with PMD on 12/28.  Declines neuro outpt referral, states will get referral from PMD.  Pt wants to go home

## 2022-12-17 NOTE — ED ADULT TRIAGE NOTE - CHIEF COMPLAINT QUOTE
C/o "headache on/off, vision changes, chest pain and b/l hand numbness all the symptoms have been occurring for a few weeks"

## 2022-12-17 NOTE — ED PROVIDER NOTE - OBJECTIVE STATEMENT
33 year old Female with hx of asthma and mitral valve presents to the ED with complaints of headache, and on and off chest pain for few weeks. Patient describe the chest pain as sharp and tightness around her chest, and states the pain lasts for hours and radiates to the back of her head. Patient has shortness of breath, dizziness, tachycardiac, numbness on both hands but denies cough, fevers, loss of consciousness, and catching cold recently. Patient describe her dizziness room spinning and a lot of pressure on her eyes. Patient further states her headache, chest pain, and dizziness happen at the same time, walks wobbly, and is sensitive to light and noises. Patient is on birth control device, and her LNP is mid November. NKDA. 33 year old Female with hx of asthma and mitral valve presents to the ED with complaints of headache, and on and off chest pain for few weeks. Patient describe the chest pain as sharp and tightness around her chest, and states the pain lasts for hours and radiates to the back of her head. Patient has shortness of breath, dizziness, tachycardiac, numbness on both hands but denies cough, fevers, loss of consciousness, and catching cold recently. Patient describe her dizziness room spinning and a lot of pressure on her eyes. Patient further states her headache, chest pain, and dizziness happen at the same time, walks wobbly, and is sensitive to light and noises. Patient is on birth control device, and her LMP is mid November. NKDA.  Pt admits to having a lot of stress, pt's a single parent to a pair of 10 y.o. twins.  Pt took nothing for her pain

## 2022-12-17 NOTE — ED PROVIDER NOTE - CARE PLAN
Principal Discharge DX:	Atypical chest pain  Secondary Diagnosis:	Migraine headache  Secondary Diagnosis:	Anxiety   1

## 2022-12-17 NOTE — ED PROVIDER NOTE - PATIENT PORTAL LINK FT
You can access the FollowMyHealth Patient Portal offered by [facility] by registering at the following website: [email or web address]. By joining [employer]’s FollowMyHealth portal, you will also be able to view your health information using other applications (apps) compatible with our system.

## 2022-12-17 NOTE — ED PROVIDER NOTE - NSFOLLOWUPINSTRUCTIONS_ED_ALL_ED_FT
Chest Pain    Chest pain can be caused by many different conditions which may or may not be dangerous. Causes include heartburn, lung infections, heart attack, blood clot in lungs, skin infections, strain or damage to muscle, cartilage, or bones, etc. In addition to a history and physical examination, an electrocardiogram (ECG) or other lab tests may have been performed to determine the cause of your chest pain. Follow up with your primary care provider or with a cardiologist as instructed.     SEEK IMMEDIATE MEDICAL CARE IF YOU HAVE ANY OF THE FOLLOWING SYMPTOMS: worsening chest pain, coughing up blood, unexplained back/neck/jaw pain, severe abdominal pain, dizziness or lightheadedness, fainting, shortness of breath, sweaty or clammy skin, vomiting, or racing heart beat. These symptoms may represent a serious problem that is an emergency. Do not wait to see if the symptoms will go away. Get medical help right away. Call 911 and do not drive yourself to the hospital.        Migraine Headache      A migraine headache is an intense, throbbing pain on one side or both sides of the head. Migraine headaches may also cause other symptoms, such as nausea, vomiting, and sensitivity to light and noise. A migraine headache can last from 4 hours to 3 days. Talk with your doctor about what things may bring on (trigger) your migraine headaches.      What are the causes?    The exact cause of this condition is not known. However, a migraine may be caused when nerves in the brain become irritated and release chemicals that cause inflammation of blood vessels. This inflammation causes pain. This condition may be triggered or caused by:  •Drinking alcohol.      •Smoking.    •Taking medicines, such as:  •Medicine used to treat chest pain (nitroglycerin).      •Birth control pills.      •Estrogen.      •Certain blood pressure medicines.        •Eating or drinking products that contain nitrates, glutamate, aspartame, or tyramine. Aged cheeses, chocolate, or caffeine may also be triggers.      •Doing physical activity.      Other things that may trigger a migraine headache include:  •Menstruation.      •Pregnancy.      •Hunger.      •Stress.      •Lack of sleep or too much sleep.      •Weather changes.      •Fatigue.        What increases the risk?    The following factors may make you more likely to experience migraine headaches:  •Being a certain age. This condition is more common in people who are 25–55 years old.      •Being female.      •Having a family history of migraine headaches.      •Being .      •Having a mental health condition, such as depression or anxiety.      •Being obese.        What are the signs or symptoms?    The main symptom of this condition is pulsating or throbbing pain. This pain may:  •Happen in any area of the head, such as on one side or both sides.      •Interfere with daily activities.      •Get worse with physical activity.      •Get worse with exposure to bright lights or loud noises.      Other symptoms may include:  •Nausea.      •Vomiting.      •Dizziness.      •General sensitivity to bright lights, loud noises, or smells.      Before you get a migraine headache, you may get warning signs (an aura). An aura may include:  •Seeing flashing lights or having blind spots.      •Seeing bright spots, halos, or zigzag lines.      •Having tunnel vision or blurred vision.      •Having numbness or a tingling feeling.      •Having trouble talking.      •Having muscle weakness.      Some people have symptoms after a migraine headache (postdromal phase), such as:  •Feeling tired.      •Difficulty concentrating.        How is this diagnosed?    A migraine headache can be diagnosed based on:  •Your symptoms.      •A physical exam.    •Tests, such as:   •CT scan or an MRI of the head. These imaging tests can help rule out other causes of headaches.      •Taking fluid from the spine (lumbar puncture) and analyzing it (cerebrospinal fluid analysis, or CSF analysis).          How is this treated?    This condition may be treated with medicines that:  •Relieve pain.      •Relieve nausea.      •Prevent migraine headaches.      Treatment for this condition may also include:  •Acupuncture.      •Lifestyle changes like avoiding foods that trigger migraine headaches.      •Biofeedback.      •Cognitive behavioral therapy.        Follow these instructions at home:    Medicines     •Take over-the-counter and prescription medicines only as told by your health care provider.    •Ask your health care provider if the medicine prescribed to you:  •Requires you to avoid driving or using heavy machinery.    •Can cause constipation. You may need to take these actions to prevent or treat constipation:  •Drink enough fluid to keep your urine pale yellow.      •Take over-the-counter or prescription medicines.      •Eat foods that are high in fiber, such as beans, whole grains, and fresh fruits and vegetables.      •Limit foods that are high in fat and processed sugars, such as fried or sweet foods.          Lifestyle     • Do not drink alcohol.      • Do not use any products that contain nicotine or tobacco, such as cigarettes, e-cigarettes, and chewing tobacco. If you need help quitting, ask your health care provider.      •Get at least 8 hours of sleep every night.      •Find ways to manage stress, such as meditation, deep breathing, or yoga.        General instructions                 •Keep a journal to find out what may trigger your migraine headaches. For example, write down:  •What you eat and drink.      •How much sleep you get.      •Any change to your diet or medicines.      •If you have a migraine headache:  •Avoid things that make your symptoms worse, such as bright lights.      •It may help to lie down in a dark, quiet room.      •Do not drive or use heavy machinery.      •Ask your health care provider what activities are safe for you while you are experiencing symptoms.        •Keep all follow-up visits as told by your health care provider. This is important.        Contact a health care provider if:    •You develop symptoms that are different or more severe than your usual migraine headache symptoms.      •You have more than 15 headache days in one month.        Get help right away if:    •Your migraine headache becomes severe.      •Your migraine headache lasts longer than 72 hours.      •You have a fever.      •You have a stiff neck.      •You have vision loss.      •Your muscles feel weak or like you cannot control them.      •You start to lose your balance often.      •You have trouble walking.      •You faint.      •You have a seizure.        Summary    •A migraine headache is an intense, throbbing pain on one side or both sides of the head. Migraines may also cause other symptoms, such as nausea, vomiting, and sensitivity to light and noise.      •This condition may be treated with medicines and lifestyle changes. You may also need to avoid certain things that trigger a migraine headache.      •Keep a journal to find out what may trigger your migraine headaches.      •Contact your health care provider if you have more than 15 headache days in a month or you develop symptoms that are different or more severe than your usual migraine headache symptoms.      This information is not intended to replace advice given to you by your health care provider. Make sure you discuss any questions you have with your health care provider.    Have your doctor refer you to neurology if no improvement  take Excedrin migraine as directed

## 2023-02-08 ENCOUNTER — APPOINTMENT (OUTPATIENT)
Dept: OBGYN | Facility: CLINIC | Age: 33
End: 2023-02-08
Payer: MEDICAID

## 2023-02-08 VITALS
BODY MASS INDEX: 26.36 KG/M2 | DIASTOLIC BLOOD PRESSURE: 79 MMHG | OXYGEN SATURATION: 98 % | WEIGHT: 178 LBS | TEMPERATURE: 97.3 F | SYSTOLIC BLOOD PRESSURE: 118 MMHG | HEIGHT: 69 IN | HEART RATE: 80 BPM

## 2023-02-08 DIAGNOSIS — T83.32XA DISPLACEMENT OF INTRAUTERINE CONTRACEPTIVE DEVICE, INITIAL ENCOUNTER: ICD-10-CM

## 2023-02-08 DIAGNOSIS — Z97.5 PRESENCE OF (INTRAUTERINE) CONTRACEPTIVE DEVICE: ICD-10-CM

## 2023-02-08 PROCEDURE — 99213 OFFICE O/P EST LOW 20 MIN: CPT

## 2023-02-08 RX ORDER — BUDESONIDE AND FORMOTEROL FUMARATE DIHYDRATE 160; 4.5 UG/1; UG/1
160-4.5 AEROSOL RESPIRATORY (INHALATION)
Refills: 0 | Status: ACTIVE | COMMUNITY

## 2023-02-08 RX ORDER — ESCITALOPRAM OXALATE 20 MG/1
20 TABLET ORAL
Qty: 30 | Refills: 0 | Status: COMPLETED | COMMUNITY
Start: 2022-07-20 | End: 2023-02-08

## 2023-02-08 NOTE — PHYSICAL EXAM
[Chaperone Present] : A chaperone was present in the examining room during all aspects of the physical examination [Examination Of The Breasts] : a normal appearance [No Masses] : no breast masses were palpable [Discharge] : discharge [Moderate] : moderate [Bloody] : bloody [IUD String] : an IUD string was noted [Normal] : normal [Normal Position] : in a normal position [Uterine Adnexae] : normal [Foul Smelling] : not foul smelling

## 2023-02-08 NOTE — HISTORY OF PRESENT ILLNESS
[Menarche Age: ____] : age at menarche was [unfilled] [No] : Patient does not have concerns regarding sex [Previously active] : previously active [Men] : men [Vaginal] : vaginal [FreeTextEntry1] : 11//2022

## 2023-02-08 NOTE — PLAN
[FreeTextEntry1] : transvaginal sonogram done iud is in place.strings seen at external os.patient states she is not sexually active&does not want to do her pregnancy test

## 2023-03-09 NOTE — ED ADULT TRIAGE NOTE - LOCATION:
-ESR and CRP elevated  -nonhealing would to left foot  -ID and Podiatry consulted and will follow recs    Left arm; [[id number]]

## 2023-04-25 NOTE — HISTORY OF PRESENT ILLNESS
[Normal Amount/Duration] :  normal amount and duration [Regular Cycle Intervals] : periods have been regular [Frequency: Q ___ days] : menstrual periods occur approximately every [unfilled] days [Menarche Age: ____] : age at menarche was [unfilled] [FreeTextEntry1] : 09/10/2022 [Currently Active] : currently active [Men] : men [Vaginal] : vaginal [No] : No Cheiloplasty (Less Than 50%) Text: A decision was made to reconstruct the defect with a  cheiloplasty.  The defect was undermined extensively.  Additional orbicularis oris muscle was excised with a 15 blade scalpel.  The defect was converted into a full thickness wedge, of less than 50% of the vertical height of the lip, to facilite a better cosmetic result.  Small vessels were then tied off with 5-0 monocyrl. The orbicularis oris, superficial fascia, adipose and dermis were then reapproximated.  After the deeper layers were approximated the epidermis was reapproximated with particular care given to realign the vermilion border.

## 2023-08-07 ENCOUNTER — APPOINTMENT (OUTPATIENT)
Dept: OBGYN | Facility: CLINIC | Age: 33
End: 2023-08-07
Payer: MEDICAID

## 2023-08-07 VITALS
BODY MASS INDEX: 26.36 KG/M2 | WEIGHT: 178 LBS | SYSTOLIC BLOOD PRESSURE: 125 MMHG | OXYGEN SATURATION: 100 % | TEMPERATURE: 98.2 F | DIASTOLIC BLOOD PRESSURE: 81 MMHG | HEIGHT: 69 IN | HEART RATE: 76 BPM

## 2023-08-07 DIAGNOSIS — N89.8 OTHER SPECIFIED NONINFLAMMATORY DISORDERS OF VAGINA: ICD-10-CM

## 2023-08-07 DIAGNOSIS — N92.6 IRREGULAR MENSTRUATION, UNSPECIFIED: ICD-10-CM

## 2023-08-07 PROCEDURE — 99214 OFFICE O/P EST MOD 30 MIN: CPT

## 2023-08-07 NOTE — HISTORY OF PRESENT ILLNESS
[Frequency: Q ___ days] : menstrual periods occur approximately every [unfilled] days [Menarche Age: ____] : age at menarche was [unfilled] [FreeTextEntry1] : 11/2022 [Currently Active] : currently active [Men] : men [Vaginal] : vaginal [No] : No [Condoms] : Condoms

## 2023-08-07 NOTE — PHYSICAL EXAM
[Chaperone Present] : A chaperone was present in the examining room during all aspects of the physical examination [Appropriately responsive] : appropriately responsive [Alert] : alert [No Acute Distress] : no acute distress [No Lymphadenopathy] : no lymphadenopathy [Regular Rate Rhythm] : regular rate rhythm [No Murmurs] : no murmurs [Clear to Auscultation B/L] : clear to auscultation bilaterally [Soft] : soft [Non-tender] : non-tender [Non-distended] : non-distended [No HSM] : No HSM [No Lesions] : no lesions [No Mass] : no mass [Oriented x3] : oriented x3 [Examination Of The Breasts] : a normal appearance [No Masses] : no breast masses were palpable [Labia Majora] : normal [Labia Minora] : normal [Discharge] : discharge [Moderate] : moderate [Foul Smelling] : not foul smelling [White] : white [Thick] : thick [Normal] : normal [Normal Position] : in a normal position [Uterine Adnexae] : normal [FreeTextEntry5] : iud strings not seen.pelvic sonogram done to cinfirm intrauterine placemnet

## 2023-08-09 LAB
CANDIDA VAG CYTO: DETECTED
G VAGINALIS+PREV SP MTYP VAG QL MICRO: NOT DETECTED
T VAGINALIS VAG QL WET PREP: NOT DETECTED

## 2023-08-09 RX ORDER — FLUCONAZOLE 150 MG/1
150 TABLET ORAL
Qty: 2 | Refills: 1 | Status: ACTIVE | COMMUNITY
Start: 2023-08-09 | End: 1900-01-01

## 2023-09-28 NOTE — ED PROVIDER NOTE - CARDIAC, MLM
Normal rate, regular rhythm.  Heart sounds S1, S2.  No murmurs, rubs or gallops. allopurinol 100 mg oral tablet: 1 tab(s) orally once a day  ARIPiprazole 10 mg oral tablet: 1 tab(s) orally once a day  carvedilol 25 mg oral tablet: 2 tab(s) orally every 12 hours  cloNIDine 0.3 mg oral tablet: 1 tab(s) orally 3 times a day  epoetin nelson: 4,000 unit(s) intravenous Monday, Wednesday, and Friday intra-dialysis, as per nephrology (nephrologist to decide on dosing)  hydrALAZINE 100 mg oral tablet: 1 tab(s) orally 3 times a day hold for systolic blood pressure less than 100  isosorbide dinitrate 30 mg oral tablet: 1 tab(s) orally 3 times a day  NIFEdipine 60 mg oral tablet, extended release: 2 tab(s) orally once a day  pantoprazole 40 mg oral delayed release tablet: 1 tab(s) orally once a day  sevelamer carbonate 800 mg oral tablet: 1 tab(s) orally 3 times a day  spironolactone 25 mg oral tablet: 2 tab(s) orally 2 times a day  Vitamin D3 50 mcg (2000 intl units) oral tablet: 1 tab(s) orally once a day   allopurinol 100 mg oral tablet: 1 tab(s) orally once a day  ARIPiprazole 10 mg oral tablet: 1 tab(s) orally once a day  carvedilol 25 mg oral tablet: 2 tab(s) orally every 12 hours  cloNIDine 0.3 mg oral tablet: 1 tab(s) orally 3 times a day  epoetin nelson: 4,000 unit(s) intravenous Monday, Wednesday, and Friday intra-dialysis, as per nephrology (nephrologist to decide on dosing)  hydrALAZINE 100 mg oral tablet: 1 tab(s) orally 3 times a day hold for systolic blood pressure less than 100  isosorbide dinitrate 30 mg oral tablet: 1 tab(s) orally 3 times a day  minoxidil 2.5 mg oral tablet: 2 tab(s) orally once a day  NIFEdipine 60 mg oral tablet, extended release: 2 tab(s) orally once a day  pantoprazole 40 mg oral delayed release tablet: 1 tab(s) orally once a day  sevelamer carbonate 800 mg oral tablet: 1 tab(s) orally 3 times a day  spironolactone 25 mg oral tablet: 2 tab(s) orally 2 times a day  Vitamin D3 50 mcg (2000 intl units) oral tablet: 1 tab(s) orally once a day

## 2023-10-23 ENCOUNTER — NON-APPOINTMENT (OUTPATIENT)
Age: 33
End: 2023-10-23

## 2023-10-28 ENCOUNTER — EMERGENCY (EMERGENCY)
Facility: HOSPITAL | Age: 33
LOS: 1 days | Discharge: HOME | End: 2023-10-28
Attending: EMERGENCY MEDICINE
Payer: MEDICAID

## 2023-10-28 VITALS
WEIGHT: 182.1 LBS | RESPIRATION RATE: 16 BRPM | SYSTOLIC BLOOD PRESSURE: 127 MMHG | DIASTOLIC BLOOD PRESSURE: 78 MMHG | TEMPERATURE: 99 F | HEART RATE: 85 BPM | OXYGEN SATURATION: 98 %

## 2023-10-28 PROCEDURE — 99283 EMERGENCY DEPT VISIT LOW MDM: CPT

## 2023-10-28 RX ORDER — IBUPROFEN 200 MG
1 TABLET ORAL
Qty: 28 | Refills: 0
Start: 2023-10-28 | End: 2023-11-03

## 2023-10-28 RX ORDER — IBUPROFEN 200 MG
600 TABLET ORAL ONCE
Refills: 0 | Status: COMPLETED | OUTPATIENT
Start: 2023-10-28 | End: 2023-10-28

## 2023-10-28 RX ORDER — ACETAMINOPHEN 500 MG
650 TABLET ORAL ONCE
Refills: 0 | Status: COMPLETED | OUTPATIENT
Start: 2023-10-28 | End: 2023-10-28

## 2023-10-28 RX ADMIN — Medication 600 MILLIGRAM(S): at 15:02

## 2023-10-28 RX ADMIN — Medication 650 MILLIGRAM(S): at 15:02

## 2023-10-28 NOTE — ED PROVIDER NOTE - CLINICAL SUMMARY MEDICAL DECISION MAKING FREE TEXT BOX
33 yr old female with hx of asthma presents to ed c/o right ear pain dx with otitis externa x 3 days on polymyxin/neomycin/hydrocortisone 4 gtt 4x/daily x10 day. no relieve. tinnitus. no fever, no instrumentation to ear. pt states having sx since 9/2023. hasn't seen an ent yet.    possibly bacterial vs viral. no sign of malignant otitis externa. pt offer to have otic drop switched but prefer to stay on it even though informed of possible benefits. continue with ibuprofen 600mg and can use tylenol as well. keep ears dry and make sure medication remains in canal to treat current infection. see ent or return especially is significantly worsens.

## 2023-10-28 NOTE — ED PROVIDER NOTE - NSFOLLOWUPINSTRUCTIONS_ED_ALL_ED_FT
continue with ibuprofen 600mg and can use tylenol as well. keep ears dry and make sure medication remains in canal to treat current infection. see ent or return especially is significantly worsens.

## 2023-10-28 NOTE — ED ADULT NURSE NOTE - NSFALLUNIVINTERV_ED_ALL_ED
Call bell, personal items and telephone in reach/Instruct patient to call for assistance before getting out of bed/chair/stretcher/Non-slip footwear applied when patient is off stretcher/[place] to call system/Physically safe environment - no spills, clutter or unnecessary equipment/Purposeful proactive rounding/Room/bathroom lighting operational, light cord in reach

## 2023-10-28 NOTE — ED PROVIDER NOTE - ENMT, MLM
Airway patent, Nasal mucosa clear. Mouth with normal mucosa. Throat has no vesicles, no oropharyngeal exudates and uvula is midline. left ear nml. right ear- normal anatomical lie, no purulent discharge. no mastoid ttp. tm intact. eac inflammatory changes, no bleeding.

## 2023-10-28 NOTE — ED PROVIDER NOTE - PATIENT PORTAL LINK FT
You can access the FollowMyHealth Patient Portal offered by [facility] by registering at the following website: [email or web address]. By joining [employer]’s FollowMyHealth portal, you will also be able to view your health information using other applications (apps) compatible with our system.

## 2023-10-28 NOTE — ED PROVIDER NOTE - OBJECTIVE STATEMENT
33 yr old female with hx of asthma presents to ed c/o right ear pain dx with otitis externa x 3 days on polymyxin/neomycin/hydrocortisone 4 gtt 4x/daily x10 day. no relieve. tinnitus. no fever, no instrumentation to ear. pt states having sx since 9/2023. hasn't seen an ent yet.

## 2023-11-23 ENCOUNTER — NON-APPOINTMENT (OUTPATIENT)
Age: 33
End: 2023-11-23

## 2024-05-29 ENCOUNTER — EMERGENCY (EMERGENCY)
Facility: HOSPITAL | Age: 34
LOS: 1 days | Discharge: HOME | End: 2024-05-29
Attending: EMERGENCY MEDICINE
Payer: MEDICAID

## 2024-05-29 VITALS
HEART RATE: 88 BPM | RESPIRATION RATE: 18 BRPM | DIASTOLIC BLOOD PRESSURE: 78 MMHG | WEIGHT: 173.94 LBS | SYSTOLIC BLOOD PRESSURE: 118 MMHG | TEMPERATURE: 98 F | HEIGHT: 70 IN | OXYGEN SATURATION: 98 %

## 2024-05-29 VITALS — HEART RATE: 81 BPM

## 2024-05-29 LAB
ALBUMIN SERPL ELPH-MCNC: 3.8 G/DL — SIGNIFICANT CHANGE UP (ref 3.5–5)
ALP SERPL-CCNC: 43 U/L — SIGNIFICANT CHANGE UP (ref 40–120)
ALT FLD-CCNC: 24 U/L DA — SIGNIFICANT CHANGE UP (ref 10–60)
ANION GAP SERPL CALC-SCNC: 3 MMOL/L — LOW (ref 5–17)
AST SERPL-CCNC: 14 U/L — SIGNIFICANT CHANGE UP (ref 10–40)
BASOPHILS # BLD AUTO: 0.05 K/UL — SIGNIFICANT CHANGE UP (ref 0–0.2)
BASOPHILS NFR BLD AUTO: 0.7 % — SIGNIFICANT CHANGE UP (ref 0–2)
BILIRUB SERPL-MCNC: 0.5 MG/DL — SIGNIFICANT CHANGE UP (ref 0.2–1.2)
BUN SERPL-MCNC: 10 MG/DL — SIGNIFICANT CHANGE UP (ref 7–18)
CALCIUM SERPL-MCNC: 9.3 MG/DL — SIGNIFICANT CHANGE UP (ref 8.4–10.5)
CHLORIDE SERPL-SCNC: 109 MMOL/L — HIGH (ref 96–108)
CO2 SERPL-SCNC: 26 MMOL/L — SIGNIFICANT CHANGE UP (ref 22–31)
CREAT SERPL-MCNC: 0.66 MG/DL — SIGNIFICANT CHANGE UP (ref 0.5–1.3)
D DIMER BLD IA.RAPID-MCNC: <150 NG/ML DDU — SIGNIFICANT CHANGE UP
EGFR: 117 ML/MIN/1.73M2 — SIGNIFICANT CHANGE UP
EOSINOPHIL # BLD AUTO: 0.28 K/UL — SIGNIFICANT CHANGE UP (ref 0–0.5)
EOSINOPHIL NFR BLD AUTO: 3.8 % — SIGNIFICANT CHANGE UP (ref 0–6)
GLUCOSE SERPL-MCNC: 87 MG/DL — SIGNIFICANT CHANGE UP (ref 70–99)
HCG SERPL-ACNC: <1 MIU/ML — SIGNIFICANT CHANGE UP
HCT VFR BLD CALC: 38 % — SIGNIFICANT CHANGE UP (ref 34.5–45)
HGB BLD-MCNC: 12.8 G/DL — SIGNIFICANT CHANGE UP (ref 11.5–15.5)
IMM GRANULOCYTES NFR BLD AUTO: 0.3 % — SIGNIFICANT CHANGE UP (ref 0–0.9)
LIDOCAIN IGE QN: 16 U/L — SIGNIFICANT CHANGE UP (ref 13–75)
LYMPHOCYTES # BLD AUTO: 1.81 K/UL — SIGNIFICANT CHANGE UP (ref 1–3.3)
LYMPHOCYTES # BLD AUTO: 24.5 % — SIGNIFICANT CHANGE UP (ref 13–44)
MAGNESIUM SERPL-MCNC: 2 MG/DL — SIGNIFICANT CHANGE UP (ref 1.6–2.6)
MCHC RBC-ENTMCNC: 29.3 PG — SIGNIFICANT CHANGE UP (ref 27–34)
MCHC RBC-ENTMCNC: 33.7 GM/DL — SIGNIFICANT CHANGE UP (ref 32–36)
MCV RBC AUTO: 87 FL — SIGNIFICANT CHANGE UP (ref 80–100)
MONOCYTES # BLD AUTO: 0.57 K/UL — SIGNIFICANT CHANGE UP (ref 0–0.9)
MONOCYTES NFR BLD AUTO: 7.7 % — SIGNIFICANT CHANGE UP (ref 2–14)
NEUTROPHILS # BLD AUTO: 4.67 K/UL — SIGNIFICANT CHANGE UP (ref 1.8–7.4)
NEUTROPHILS NFR BLD AUTO: 63 % — SIGNIFICANT CHANGE UP (ref 43–77)
NRBC # BLD: 0 /100 WBCS — SIGNIFICANT CHANGE UP (ref 0–0)
PLATELET # BLD AUTO: 295 K/UL — SIGNIFICANT CHANGE UP (ref 150–400)
POTASSIUM SERPL-MCNC: 4.2 MMOL/L — SIGNIFICANT CHANGE UP (ref 3.5–5.3)
POTASSIUM SERPL-SCNC: 4.2 MMOL/L — SIGNIFICANT CHANGE UP (ref 3.5–5.3)
PROT SERPL-MCNC: 7.1 G/DL — SIGNIFICANT CHANGE UP (ref 6–8.3)
RBC # BLD: 4.37 M/UL — SIGNIFICANT CHANGE UP (ref 3.8–5.2)
RBC # FLD: 12.5 % — SIGNIFICANT CHANGE UP (ref 10.3–14.5)
SODIUM SERPL-SCNC: 138 MMOL/L — SIGNIFICANT CHANGE UP (ref 135–145)
TROPONIN I, HIGH SENSITIVITY RESULT: <3 NG/L — SIGNIFICANT CHANGE UP
WBC # BLD: 7.4 K/UL — SIGNIFICANT CHANGE UP (ref 3.8–10.5)
WBC # FLD AUTO: 7.4 K/UL — SIGNIFICANT CHANGE UP (ref 3.8–10.5)

## 2024-05-29 PROCEDURE — 99285 EMERGENCY DEPT VISIT HI MDM: CPT | Mod: 25

## 2024-05-29 PROCEDURE — 71046 X-RAY EXAM CHEST 2 VIEWS: CPT

## 2024-05-29 PROCEDURE — 85379 FIBRIN DEGRADATION QUANT: CPT

## 2024-05-29 PROCEDURE — 80053 COMPREHEN METABOLIC PANEL: CPT

## 2024-05-29 PROCEDURE — 83690 ASSAY OF LIPASE: CPT

## 2024-05-29 PROCEDURE — 71046 X-RAY EXAM CHEST 2 VIEWS: CPT | Mod: 26

## 2024-05-29 PROCEDURE — 36415 COLL VENOUS BLD VENIPUNCTURE: CPT

## 2024-05-29 PROCEDURE — 96374 THER/PROPH/DIAG INJ IV PUSH: CPT

## 2024-05-29 PROCEDURE — 99285 EMERGENCY DEPT VISIT HI MDM: CPT

## 2024-05-29 PROCEDURE — 85025 COMPLETE CBC W/AUTO DIFF WBC: CPT

## 2024-05-29 PROCEDURE — 94640 AIRWAY INHALATION TREATMENT: CPT

## 2024-05-29 PROCEDURE — 83735 ASSAY OF MAGNESIUM: CPT

## 2024-05-29 PROCEDURE — 84702 CHORIONIC GONADOTROPIN TEST: CPT

## 2024-05-29 PROCEDURE — 84484 ASSAY OF TROPONIN QUANT: CPT

## 2024-05-29 PROCEDURE — 93005 ELECTROCARDIOGRAM TRACING: CPT

## 2024-05-29 RX ORDER — IPRATROPIUM/ALBUTEROL SULFATE 18-103MCG
3 AEROSOL WITH ADAPTER (GRAM) INHALATION
Refills: 0 | Status: COMPLETED | OUTPATIENT
Start: 2024-05-29 | End: 2024-05-29

## 2024-05-29 RX ORDER — DEXAMETHASONE 0.5 MG/5ML
6 ELIXIR ORAL ONCE
Refills: 0 | Status: COMPLETED | OUTPATIENT
Start: 2024-05-29 | End: 2024-05-29

## 2024-05-29 RX ADMIN — Medication 3 MILLILITER(S): at 09:59

## 2024-05-29 RX ADMIN — Medication 3 MILLILITER(S): at 09:58

## 2024-05-29 RX ADMIN — Medication 6 MILLIGRAM(S): at 09:59

## 2024-05-29 RX ADMIN — Medication 3 MILLILITER(S): at 09:56

## 2024-05-29 NOTE — ED PROVIDER NOTE - PATIENT PORTAL LINK FT
You can access the FollowMyHealth Patient Portal offered by [facility] by registering at the following website: [email or web address]. By joining [employer]’s FollowMyHealth portal, you will also be able to view your health information using other applications (apps) compatible with our system.

## 2024-05-29 NOTE — ED PROVIDER NOTE - CARDIOVASCULAR [+], MLM
CHEST PAIN/PALPITATIONS Doxepin Counseling:  Patient advised that the medication is sedating and not to drive a car after taking this medication. Patient informed of potential adverse effects including but not limited to dry mouth, urinary retention, and blurry vision.  The patient verbalized understanding of the proper use and possible adverse effects of doxepin.  All of the patient's questions and concerns were addressed.

## 2024-05-29 NOTE — ED PROVIDER NOTE - CLINICAL SUMMARY MEDICAL DECISION MAKING FREE TEXT BOX
34-year-old female presenting with nonproductive cough and wheezing.  In no acute distress also relates chest pain that appears atypical.  Plan to perform EKG, labs including cardiac enzymes, chest x-ray and reassess.  Symptoms may suggest asthma exacerbation, allergies, pneumonia, less likely ACS among other causes.

## 2024-05-29 NOTE — ED ADULT TRIAGE NOTE - NSWEIGHTCALCTOOLDRUG_GEN_A_CORE
Patient is needing refill         Norgestim-Eth Estrad Triphasic (TRI-SPRINTEC) 0.18/0.215/0.25 MG-35 MCG Oral Tab        [facility] [phone], [phone]  used

## 2024-05-29 NOTE — ED PROVIDER NOTE - NSFOLLOWUPINSTRUCTIONS_ED_ALL_ED_FT
Wheezing    AMBULATORY CARE:    What you need to know about wheezing: Wheezing happens when air flows through a narrowed or blocked airway. Wheezing can happen when you breathe in, breathe out, or both. Wheezes may sound like a whistle, squeal, groan, or creak. Wheezes may also sound musical or high-pitched.    Common signs and symptoms:    Noisy breathing    Shortness of breath    Chest tightness    Fast breathing or heart rate    Cough  Call your local emergency number (911 in the [place]) if:    You feel lightheaded, short of breath, and have chest pain.    You are dizzy, confused, or feel faint.    You have sudden trouble breathing.    Your throat feels like it is swelling or feels tight.  Seek care immediately if:    You cough up blood.    Call your doctor if:    You have a fever.    Your wheezing does not get better or it gets worse.    You have questions or concerns about your condition or care.  Medicines:    Medicines may help open your airways, decrease your symptoms, or treat an infection. They may be given as an inhaler, nebulizer, or pill.    Take your medicine as directed. Contact your healthcare provider if you think your medicine is not helping or if you have side effects. Tell your provider if you are allergic to any medicine. Keep a list of the medicines, vitamins, and herbs you take. Include the amounts, and when and why you take them. Bring the list or the pill bottles to follow-up visits. Carry your medicine list with you in case of an emergency.  Self-care:    Return to your usual activity as directed. You may need to limit certain activities. Ask your healthcare provider when it is okay to resume activity.    Take deep breaths and cough several times a day. This will decrease your risk for a lung infection and help decrease wheezing. Take a deep breath and hold it for as long as you can. Let the air out and then cough strongly. Deep breaths help open your airway. You may be given an incentive spirometer to help you take deep breaths. Put the plastic piece in your mouth and take a slow, deep breath in, then let the air out and cough. Repeat these steps 10 times every hour.    Drink liquids as directed. You may need to drink more liquids than usual to thin your mucus and prevent dehydration. Ask how much liquid to drink each day and which liquids are best for you.  Prevent wheezing:    Do not smoke. Nicotine and other chemicals in cigarettes and cigars can cause lung damage. Ask your healthcare provider for information if you currently smoke and need help to quit. E-cigarettes or smokeless tobacco still contain nicotine. Talk to your healthcare provider before you use these products.    Avoid allergy triggers, such as animals, grass, pollen, or dust.  Follow up with your doctor as directed: You may be referred to a specialist. Write down your questions so you remember to ask them during your visits.    © [facility] [employer] 1973, 2024     Chest Pain    WHAT YOU NEED TO KNOW:    Chest pain can be caused by a range of conditions, from not serious to life-threatening. Chest pain can be a symptom of a digestive problem, such as acid reflux or a stomach ulcer. An anxiety attack or a strong emotion, such as anger, can also cause chest pain. Infection, inflammation, or a fracture in the bones or cartilage in your chest can cause pain or discomfort. Sometimes chest pain or pressure is caused by poor blood flow to your heart (angina). Chest pain may also be caused by life-threatening conditions such as a heart attack or blood clot in your lungs.    DISCHARGE INSTRUCTIONS:    Call your local emergency number (911 in the ) or have someone call if:    You have any of the following signs of a heart attack:  Squeezing, pressure, or pain in your chest    You may also have any of the following:  Discomfort or pain in your back, neck, jaw, stomach, or arm    Shortness of breath    Nausea or vomiting    Lightheadedness or a sudden cold sweat    Seek care immediately if:    You have chest discomfort that gets worse, even with medicine.    You cough or vomit blood.    Your bowel movements are black or bloody.    You cannot stop vomiting, or it hurts to swallow.  Call your doctor if:    You have questions or concerns about your condition or care.    Medicines:    Medicines may be given to treat the cause of your chest pain. Examples include pain medicine, anxiety medicine, or medicines to increase blood flow to your heart.    Do not take certain medicines without asking your healthcare provider first. These include NSAIDs, herbal or vitamin supplements, and hormones, such as estrogen or progestin.    Take your medicine as directed. Contact your healthcare provider if you think your medicine is not helping or if you have side effects. Tell your provider if you are allergic to any medicine. Keep a list of the medicines, vitamins, and herbs you take. Include the amounts, and when and why you take them. Bring the list or the pill bottles to follow-up visits. Carry your medicine list with you in case of an emergency.  Healthy living tips: If the cause of your chest pain is known, your healthcare provider will give you specific guidelines to follow. The following are general healthy guidelines:    Do not smoke. Nicotine and other chemicals in cigarettes and cigars can cause lung and heart damage. Ask your healthcare provider for information if you currently smoke and need help to quit. E-cigarettes or smokeless tobacco still contain nicotine. Talk to your healthcare provider before you use these products.    Choose a variety of healthy foods as often as possible. Include fresh, frozen, or canned fruits and vegetables. Also include low-fat dairy products, fish, chicken (without skin), and lean meats. Your healthcare provider or a dietitian can help you create meal plans. You may need to avoid certain foods or drinks if your pain is caused by a digestion problem.  Healthy Foods      Lower your sodium (salt) intake. Limit foods that are high in sodium, such as canned foods, salty snacks, and cold cuts. If you add salt when you cook food, do not add more at the table. Choose low-sodium canned foods as much as possible.        Drink plenty of water every day. Water helps your body to control temperature and blood pressure. Ask your healthcare provider how much water you should drink every day.    Ask about activity. Your healthcare provider will tell you which activities to limit or avoid. Ask when you can drive, return to work, and have sex. Ask about the best exercise plan for you.    Maintain a healthy weight. Ask your healthcare provider what a healthy weight is for you. Ask him or her to help you create a safe weight loss plan if you are overweight.    Ask about vaccines you may need. Your healthcare provider can tell you which vaccines you need, and when to get them. The following vaccines help prevent diseases that can become serious for a person with a heart condition:  The influenza (flu) vaccine is given each year. Get a flu vaccine as soon as recommended, usually in September or October.    The pneumonia vaccine is usually given every 5 years. Your healthcare provider may recommend the pneumonia vaccine if you are 65 or older.    COVID-19 vaccines are given to adults as a shot. At least 1 dose of an updated vaccine is recommended for all adults. COVID-19 vaccines are updated throughout the year. Adults 65 or older need a second dose of updated vaccine at least 4 months after the first dose. Your healthcare provider can help you schedule all needed doses as updated vaccines become available.  Prevent Heart Disease   Follow up with your doctor within 72 hours, or as directed: You may need to return for more tests to find the cause of your chest pain. You may be referred to a specialist, such as a cardiologist or gastroenterologist. Write down your questions so you remember to ask them during your visits.    © [employer] 1973, 2024

## 2024-05-29 NOTE — ED PROVIDER NOTE - MDM ORDERS SUBMITTED SELECTION
Airway patent, Nasal mucosa clear. Mouth with normal mucosa. Throat has no vesicles, no oropharyngeal exudates and uvula is midline.
Imaging Studies/Medications

## 2024-05-29 NOTE — ED PROVIDER NOTE - OBJECTIVE STATEMENT
34-year-old female with history of asthma, HLD, HTN presenting with nonproductive cough shortness of breath and palpitations for past 3 days.  Reports sharp left-sided chest pain that is worse with deep breath and cough.  Denies any fever, chills, dizziness, leg pain or leg swelling.

## 2024-05-29 NOTE — ED PROVIDER NOTE - PROGRESS NOTE DETAILS
Patient is replaced feeling much better.  Educated on results of labs and imaging.  Discussed signs and symptoms to return sooner to ED.

## 2024-07-29 ENCOUNTER — NON-APPOINTMENT (OUTPATIENT)
Age: 34
End: 2024-07-29

## 2024-07-30 ENCOUNTER — APPOINTMENT (OUTPATIENT)
Dept: OBGYN | Facility: CLINIC | Age: 34
End: 2024-07-30
Payer: MEDICAID

## 2024-07-30 VITALS
WEIGHT: 174 LBS | HEIGHT: 69 IN | OXYGEN SATURATION: 100 % | DIASTOLIC BLOOD PRESSURE: 65 MMHG | HEART RATE: 79 BPM | BODY MASS INDEX: 25.77 KG/M2 | TEMPERATURE: 97.3 F | SYSTOLIC BLOOD PRESSURE: 109 MMHG

## 2024-07-30 DIAGNOSIS — N92.6 IRREGULAR MENSTRUATION, UNSPECIFIED: ICD-10-CM

## 2024-07-30 DIAGNOSIS — Z86.39 PERSONAL HISTORY OF OTHER ENDOCRINE, NUTRITIONAL AND METABOLIC DISEASE: ICD-10-CM

## 2024-07-30 PROCEDURE — 99213 OFFICE O/P EST LOW 20 MIN: CPT

## 2024-07-30 PROCEDURE — 99459 PELVIC EXAMINATION: CPT

## 2024-07-30 RX ORDER — BUDESONIDE, GLYCOPYRROLATE, AND FORMOTEROL FUMARATE 160; 9; 4.8 UG/1; UG/1; UG/1
160-9-4.8 AEROSOL, METERED RESPIRATORY (INHALATION)
Refills: 0 | Status: ACTIVE | COMMUNITY

## 2024-07-30 RX ORDER — ASPIRIN 81 MG
81 TABLET, DELAYED RELEASE (ENTERIC COATED) ORAL
Refills: 0 | Status: ACTIVE | COMMUNITY

## 2024-07-30 NOTE — HISTORY OF PRESENT ILLNESS
[unknown] : Patient is unsure of the date of her LMP [Menarche Age: ____] : age at menarche was [unfilled] [Previously active] : previously active [Men] : men [Vaginal] : vaginal [No] : No [FreeTextEntry1] : came for routine checkup .lmp 2yrs back ,gets spotting in 02/2024

## 2024-07-30 NOTE — PHYSICAL EXAM
[Chaperone Present] : A chaperone was present in the examining room during all aspects of the physical examination [[id number]] : A chaperone was present during the pelvic exam. [Appropriately responsive] : appropriately responsive [Alert] : alert [No Acute Distress] : no acute distress [No Lymphadenopathy] : no lymphadenopathy [Regular Rate Rhythm] : regular rate rhythm [No Murmurs] : no murmurs [Clear to Auscultation B/L] : clear to auscultation bilaterally [Soft] : soft [Non-tender] : non-tender [Non-distended] : non-distended [No HSM] : No HSM [No Lesions] : no lesions [No Mass] : no mass [Oriented x3] : oriented x3 [Examination Of The Breasts] : a normal appearance [No Masses] : no breast masses were palpable [Labia Majora] : normal [Labia Minora] : normal [Discharge] : discharge [Scant] : scant [White] : white [Thick] : thick [IUD String] : an IUD string was noted [Normal] : normal [Normal Position] : in a normal position [Uterine Adnexae] : normal [FreeTextEntry2] : sahra lopez

## 2024-07-30 NOTE — PLAN
[FreeTextEntry1] : patient sates her blood test came positive for syphilis .her doctor repeated her blood test

## 2024-08-04 LAB — CYTOLOGY CVX/VAG DOC THIN PREP: NORMAL

## 2024-09-06 NOTE — H&P ADULT - MUSCULOSKELETAL
negative No joint pain, swelling or deformity; no limitation of movement -zyprexa prn as above  -monitor ecg, oversedation  -b12, rpr, tsh if not done  -no indication for psych 1:1 or admission  -Dementia/Delirium precautions     -Minimize lines/tethers when possible, disconnect/discontinue if safe and not in use     -Ensure patient has and uses hearing aids, glasses, and dentures during the day     -Normalize day-night cycle, lights on/blinds open during the day, minimize disturbances at night (lights off/blinds closed/TV off at night)     -Avoid deliriogenic medications if possible (benzodiazepines, non-opioid pain control when possible, minimize anticholinergic burden)     -Out of bed to chair if possible during the day      -Frequent reorientation, instruct family/visitors to reorient patient      -Ensure patient is voiding/having regular bowel movements

## 2025-04-02 NOTE — ED PROVIDER NOTE - DISPOSITION TYPE
I talked to her and now she is aware that Dr. Warren did refer to pharmacy, when you get a chance call her, she usually answers the phone but if she can not just leave her a message and she will call you back  
Thank you  
ADMIT

## 2025-05-05 ENCOUNTER — NON-APPOINTMENT (OUTPATIENT)
Age: 35
End: 2025-05-05

## 2025-06-25 NOTE — ED PROVIDER NOTE - CCCP TRG CHIEF CMPLNT
Pt stopped in our [place] office stating that date on form needs to be updated.  Pt stated that even though he was seen by Dr. Cohen on 06/16/2025, he was out of work since 05/16/2025.    Pt stated he first went to urgent care then PCP.  Pt was then in ED 05/23/2025.    Can you please update form page 3  Question # 1    Faxed forms to hospital.     Once completed, please fax to ATT: Viviane [phone] as per pt request  Pt would like a call once completed.    tired and weak

## 2025-07-17 ENCOUNTER — APPOINTMENT (OUTPATIENT)
Dept: OBGYN | Facility: CLINIC | Age: 35
End: 2025-07-17

## 2025-07-17 ENCOUNTER — NON-APPOINTMENT (OUTPATIENT)
Age: 35
End: 2025-07-17

## 2025-07-17 VITALS
BODY MASS INDEX: 23.99 KG/M2 | WEIGHT: 162 LBS | DIASTOLIC BLOOD PRESSURE: 76 MMHG | TEMPERATURE: 98.1 F | SYSTOLIC BLOOD PRESSURE: 122 MMHG | HEIGHT: 69 IN | HEART RATE: 90 BPM | OXYGEN SATURATION: 99 %

## 2025-07-17 PROBLEM — Z11.3 SCREENING FOR STDS (SEXUALLY TRANSMITTED DISEASES): Status: ACTIVE | Noted: 2025-07-17

## 2025-07-17 PROBLEM — Z80.1 FAMILY HISTORY OF LUNG CANCER: Status: ACTIVE | Noted: 2025-07-17

## 2025-07-17 PROCEDURE — 99213 OFFICE O/P EST LOW 20 MIN: CPT

## 2025-07-17 PROCEDURE — 81025 URINE PREGNANCY TEST: CPT

## 2025-07-17 PROCEDURE — 99459 PELVIC EXAMINATION: CPT

## 2025-07-18 LAB
HIV1+2 AB SPEC QL IA.RAPID: NONREACTIVE
HSV 1+2 IGG SER IA-IMP: NEGATIVE
HSV 1+2 IGG SER IA-IMP: POSITIVE
HSV1 IGG SER QL: 33.4 INDEX
HSV2 IGG SER QL: 0.04 INDEX
T PALLIDUM AB SER QL IA: NEGATIVE

## 2025-07-21 DIAGNOSIS — B37.31 ACUTE CANDIDIASIS OF VULVA AND VAGINA: ICD-10-CM

## 2025-07-21 LAB
BV BACTERIA RRNA VAG QL NAA+PROBE: NOT DETECTED
C GLABRATA RNA VAG QL NAA+PROBE: NOT DETECTED
C TRACH RRNA SPEC QL NAA+PROBE: NOT DETECTED
CANDIDA RRNA VAG QL PROBE: DETECTED
N GONORRHOEA RRNA SPEC QL NAA+PROBE: NOT DETECTED
T VAGINALIS RRNA SPEC QL NAA+PROBE: NOT DETECTED

## 2025-07-21 RX ORDER — FLUCONAZOLE 150 MG/1
150 TABLET ORAL
Qty: 2 | Refills: 2 | Status: ACTIVE | COMMUNITY
Start: 2025-07-21 | End: 1900-01-01

## 2025-07-30 ENCOUNTER — APPOINTMENT (OUTPATIENT)
Dept: ULTRASOUND IMAGING | Facility: CLINIC | Age: 35
End: 2025-07-30

## 2025-07-30 PROCEDURE — 76856 US EXAM PELVIC COMPLETE: CPT

## 2025-07-30 PROCEDURE — 76830 TRANSVAGINAL US NON-OB: CPT

## 2025-08-08 ENCOUNTER — EMERGENCY (EMERGENCY)
Facility: HOSPITAL | Age: 35
LOS: 1 days | End: 2025-08-08
Attending: STUDENT IN AN ORGANIZED HEALTH CARE EDUCATION/TRAINING PROGRAM
Payer: MEDICAID

## 2025-08-08 VITALS
WEIGHT: 163.8 LBS | SYSTOLIC BLOOD PRESSURE: 132 MMHG | RESPIRATION RATE: 18 BRPM | TEMPERATURE: 99 F | OXYGEN SATURATION: 98 % | DIASTOLIC BLOOD PRESSURE: 80 MMHG | HEART RATE: 80 BPM | HEIGHT: 70 IN

## 2025-08-08 LAB — HCG UR QL: NEGATIVE — SIGNIFICANT CHANGE UP

## 2025-08-08 PROCEDURE — 99283 EMERGENCY DEPT VISIT LOW MDM: CPT | Mod: 25

## 2025-08-08 PROCEDURE — 73502 X-RAY EXAM HIP UNI 2-3 VIEWS: CPT

## 2025-08-08 PROCEDURE — 73502 X-RAY EXAM HIP UNI 2-3 VIEWS: CPT | Mod: 26,LT

## 2025-08-08 PROCEDURE — 81025 URINE PREGNANCY TEST: CPT

## 2025-08-08 PROCEDURE — 99284 EMERGENCY DEPT VISIT MOD MDM: CPT

## 2025-08-08 RX ORDER — IBUPROFEN 200 MG
600 TABLET ORAL ONCE
Refills: 0 | Status: COMPLETED | OUTPATIENT
Start: 2025-08-08 | End: 2025-08-08

## 2025-08-08 RX ADMIN — Medication 600 MILLIGRAM(S): at 13:25

## 2025-08-08 RX ADMIN — Medication 600 MILLIGRAM(S): at 14:39
